# Patient Record
Sex: FEMALE | Race: WHITE | NOT HISPANIC OR LATINO | Employment: OTHER | URBAN - METROPOLITAN AREA
[De-identification: names, ages, dates, MRNs, and addresses within clinical notes are randomized per-mention and may not be internally consistent; named-entity substitution may affect disease eponyms.]

---

## 2017-09-25 ENCOUNTER — HOSPITAL ENCOUNTER (EMERGENCY)
Facility: HOSPITAL | Age: 37
Discharge: HOME | End: 2017-09-25
Attending: EMERGENCY MEDICINE | Admitting: EMERGENCY MEDICINE
Payer: COMMERCIAL

## 2017-09-25 VITALS
RESPIRATION RATE: 18 BRPM | TEMPERATURE: 98 F | DIASTOLIC BLOOD PRESSURE: 85 MMHG | WEIGHT: 185 LBS | BODY MASS INDEX: 31.58 KG/M2 | OXYGEN SATURATION: 97 % | HEART RATE: 76 BPM | SYSTOLIC BLOOD PRESSURE: 126 MMHG | HEIGHT: 64 IN

## 2017-09-25 DIAGNOSIS — L02.412 ABSCESS OF AXILLA, LEFT: ICD-10-CM

## 2017-09-25 DIAGNOSIS — L73.2 HYDRADENITIS: Primary | ICD-10-CM

## 2017-09-25 PROCEDURE — 87186 SC STD MICRODIL/AGAR DIL: CPT | Performed by: EMERGENCY MEDICINE

## 2017-09-25 PROCEDURE — 87070 CULTURE OTHR SPECIMN AEROBIC: CPT | Performed by: EMERGENCY MEDICINE

## 2017-09-25 PROCEDURE — 87147 CULTURE TYPE IMMUNOLOGIC: CPT | Performed by: EMERGENCY MEDICINE

## 2017-09-25 PROCEDURE — 87205 SMEAR GRAM STAIN: CPT | Performed by: EMERGENCY MEDICINE

## 2017-09-25 PROCEDURE — 99283 EMERGENCY DEPT VISIT LOW MDM: CPT

## 2017-09-25 RX ORDER — LIDOCAINE HYDROCHLORIDE AND EPINEPHRINE 10; 10 MG/ML; UG/ML
10 INJECTION, SOLUTION INFILTRATION; PERINEURAL ONCE
Status: COMPLETED | OUTPATIENT
Start: 2017-09-25 | End: 2017-09-25

## 2017-09-25 RX ORDER — DOXYCYCLINE HYCLATE 100 MG/1
100 CAPSULE ORAL EVERY 12 HOURS SCHEDULED
COMMUNITY
End: 2020-11-12 | Stop reason: ALTCHOICE

## 2017-09-25 RX ORDER — CLINDAMYCIN HYDROCHLORIDE 300 MG/1
300 CAPSULE ORAL 4 TIMES DAILY
Qty: 40 CAPSULE | Refills: 0 | Status: SHIPPED | OUTPATIENT
Start: 2017-09-25 | End: 2017-10-05

## 2017-09-25 RX ADMIN — LIDOCAINE HYDROCHLORIDE,EPINEPHRINE BITARTRATE 10 ML: 10; .01 INJECTION, SOLUTION INFILTRATION; PERINEURAL at 12:38

## 2017-09-27 LAB
BACTERIA WND AEROBE CULT: NORMAL
GRAM STN SPEC: NORMAL
GRAM STN SPEC: NORMAL

## 2020-11-12 ENCOUNTER — OFFICE VISIT (OUTPATIENT)
Dept: OBGYN CLINIC | Facility: CLINIC | Age: 40
End: 2020-11-12
Payer: COMMERCIAL

## 2020-11-12 VITALS
HEIGHT: 64 IN | SYSTOLIC BLOOD PRESSURE: 142 MMHG | WEIGHT: 193 LBS | DIASTOLIC BLOOD PRESSURE: 100 MMHG | TEMPERATURE: 97.8 F | BODY MASS INDEX: 32.95 KG/M2

## 2020-11-12 DIAGNOSIS — Z12.31 ENCOUNTER FOR SCREENING MAMMOGRAM FOR BREAST CANCER: ICD-10-CM

## 2020-11-12 DIAGNOSIS — Z12.4 SCREENING FOR MALIGNANT NEOPLASM OF THE CERVIX: ICD-10-CM

## 2020-11-12 DIAGNOSIS — Z11.51 SCREENING FOR HUMAN PAPILLOMAVIRUS: ICD-10-CM

## 2020-11-12 DIAGNOSIS — Z01.419 ENCNTR FOR GYN EXAM (GENERAL) (ROUTINE) W/O ABN FINDINGS: Primary | ICD-10-CM

## 2020-11-12 PROCEDURE — 99386 PREV VISIT NEW AGE 40-64: CPT | Performed by: OBSTETRICS & GYNECOLOGY

## 2020-11-12 PROCEDURE — G0145 SCR C/V CYTO,THINLAYER,RESCR: HCPCS | Performed by: OBSTETRICS & GYNECOLOGY

## 2020-11-12 PROCEDURE — 87624 HPV HI-RISK TYP POOLED RSLT: CPT | Performed by: OBSTETRICS & GYNECOLOGY

## 2020-11-12 RX ORDER — LEVONORGESTREL AND ETHINYL ESTRADIOL 0.1-0.02MG
1 KIT ORAL DAILY
COMMUNITY
End: 2020-11-12 | Stop reason: SDUPTHER

## 2020-11-12 RX ORDER — LEVONORGESTREL AND ETHINYL ESTRADIOL 0.1-0.02MG
1 KIT ORAL DAILY
Qty: 84 TABLET | Refills: 3 | Status: SHIPPED | OUTPATIENT
Start: 2020-11-12 | End: 2021-11-26

## 2020-11-13 LAB
HPV HR 12 DNA CVX QL NAA+PROBE: NEGATIVE
HPV16 DNA CVX QL NAA+PROBE: NEGATIVE
HPV18 DNA CVX QL NAA+PROBE: NEGATIVE

## 2020-11-18 LAB
LAB AP GYN PRIMARY INTERPRETATION: NORMAL
LAB AP LMP: NORMAL
Lab: NORMAL
PATH INTERP SPEC-IMP: NORMAL

## 2021-01-06 ENCOUNTER — HOSPITAL ENCOUNTER (OUTPATIENT)
Dept: MAMMOGRAPHY | Facility: CLINIC | Age: 41
Discharge: HOME | End: 2021-01-06
Payer: COMMERCIAL

## 2021-01-06 VITALS — WEIGHT: 190 LBS | HEIGHT: 64 IN | BODY MASS INDEX: 32.44 KG/M2

## 2021-01-06 DIAGNOSIS — Z12.31 ENCOUNTER FOR SCREENING MAMMOGRAM FOR BREAST CANCER: ICD-10-CM

## 2021-01-06 PROCEDURE — 77067 SCR MAMMO BI INCL CAD: CPT

## 2021-01-06 PROCEDURE — 77063 BREAST TOMOSYNTHESIS BI: CPT

## 2021-01-09 NOTE — RESULT ENCOUNTER NOTE
Incomplete: Needs Additional Imaging Evaluation     RECOMMENDATION:       - Diagnostic mammogram at the current time for the right breast        - Routine screening mammogram in 1 year for the left breast

## 2021-01-14 ENCOUNTER — HOSPITAL ENCOUNTER (OUTPATIENT)
Dept: MAMMOGRAPHY | Facility: CLINIC | Age: 41
Discharge: HOME | End: 2021-01-14
Payer: COMMERCIAL

## 2021-01-14 ENCOUNTER — TRANSCRIBE ORDERS (OUTPATIENT)
Dept: MAMMOGRAPHY | Facility: CLINIC | Age: 41
End: 2021-01-14

## 2021-01-14 VITALS — BODY MASS INDEX: 32.44 KG/M2 | WEIGHT: 190 LBS | HEIGHT: 64 IN

## 2021-01-14 DIAGNOSIS — R92.8 ABNORMAL MAMMOGRAM: Primary | ICD-10-CM

## 2021-01-14 DIAGNOSIS — R92.8 ABNORMAL MAMMOGRAM: ICD-10-CM

## 2021-01-14 PROCEDURE — 77065 DX MAMMO INCL CAD UNI: CPT

## 2021-01-15 DIAGNOSIS — R92.1 BREAST CALCIFICATIONS: Primary | ICD-10-CM

## 2021-02-03 ENCOUNTER — OFFICE VISIT (OUTPATIENT)
Dept: GASTROENTEROLOGY | Facility: CLINIC | Age: 41
End: 2021-02-03
Payer: COMMERCIAL

## 2021-02-03 VITALS
WEIGHT: 198.2 LBS | HEIGHT: 64 IN | DIASTOLIC BLOOD PRESSURE: 80 MMHG | SYSTOLIC BLOOD PRESSURE: 120 MMHG | HEART RATE: 94 BPM | BODY MASS INDEX: 33.84 KG/M2

## 2021-02-03 DIAGNOSIS — R11.0 NAUSEA: Primary | ICD-10-CM

## 2021-02-03 DIAGNOSIS — K21.9 GASTROESOPHAGEAL REFLUX DISEASE WITHOUT ESOPHAGITIS: ICD-10-CM

## 2021-02-03 DIAGNOSIS — R14.1 ABDOMINAL GAS PAIN: ICD-10-CM

## 2021-02-03 DIAGNOSIS — K90.89 BILE SALT-INDUCED DIARRHEA: ICD-10-CM

## 2021-02-03 PROCEDURE — 99203 OFFICE O/P NEW LOW 30 MIN: CPT | Performed by: PHYSICIAN ASSISTANT

## 2021-02-03 RX ORDER — ESOMEPRAZOLE MAGNESIUM 40 MG/1
40 CAPSULE, DELAYED RELEASE ORAL
Qty: 60 CAPSULE | Refills: 3 | Status: SHIPPED | OUTPATIENT
Start: 2021-02-03 | End: 2021-04-29

## 2021-02-03 RX ORDER — LEVOTHYROXINE SODIUM 175 MCG
TABLET ORAL
COMMUNITY
Start: 2020-11-25

## 2021-02-03 RX ORDER — MONTELUKAST SODIUM 4 MG/1
1 TABLET, CHEWABLE ORAL 2 TIMES DAILY
Qty: 60 TABLET | Refills: 3 | Status: SHIPPED | OUTPATIENT
Start: 2021-02-03 | End: 2021-04-28

## 2021-02-03 NOTE — PROGRESS NOTES
Answers for HPI/ROS submitted by the patient on 1/30/2021   Abdominal pain  Chronicity: chronic  Onset: more than 1 month ago  Onset quality: gradual  Frequency: every several days  Episode duration: 3 hours  Progression since onset: unchanged  Pain location: generalized abdominal region  Pain - numeric: 6/10  Pain quality: aching, burning, dull  Radiates to: epigastric region  anorexia: No  arthralgias: No  belching: Yes  constipation: No  diarrhea: Yes  dysuria: No  fever: No  flatus: No  frequency: No  headaches: No  hematochezia: No  hematuria: No  melena: No  myalgias: No  nausea: Yes  weight loss: No  vomiting: No  Aggravated by: nothing  Relieved by: belching, vomiting  Diagnostic workup: upper endoscopy  [facility] - Outpatient Consultation  Yesenia Lemons 36 y o  female MRN: [id number]  Encounter: [id number]          ASSESSMENT AND PLAN:      1  Nausea  2  Gastroesophageal reflux disease without esophagitis  3  Abdominal gas pain   Will obtain previous EGD results from [address] [place] [address] in \[facility] [place] [facility]\""  Will start esomeprazole 40 mg twice a day for at least 8-12 weeks  Will plan gastric emptying study solid phase  4  Bile salt-induced diarrhea   Will start colestipol 1 g a day  Will start over-the-counter align probiotic as well as sugar free fiber supplement     ______________________________________________________________________    HPI:      44-year-old female who presents to the office today with ongoing stomach issues for many years  Patient does suffer from chronic heartburn and she does medicate with over-the-counter antacids on a daily basis  Patient reports over the last several months she has been really struggling with not feeling well after eating  She reports that every time she eats whether be something good or bad for her she will develop a heavy sick nauseous feeling  She denies any significant dietary triggers    She reports she was given Carafate as well as Reglan for a 2 week trial that did not help her at all  She was also given pantoprazole several months ago as a trial and she did this for 5-6 weeks with no benefit  She reports that she did have her gallbladder taken out secondary to biliary dyskinesia many years ago  She did recently have an upper endoscopy performed by Dr Sweta Bach  We will need to obtain these results  Patient denies any alarm symptoms  She denies any melena or rectal bleeding  She reports diarrhea likely secondary to a bile salt diarrhea  She reports immediately after she eats she will be in the bathroom within a few minutes time  REVIEW OF SYSTEMS:    CONSTITUTIONAL: Denies any fever, chills, rigors, and weight loss  HEENT: No earache or tinnitus  Denies hearing loss or visual disturbances  CARDIOVASCULAR: No chest pain or palpitations  RESPIRATORY: Denies any cough, hemoptysis, shortness of breath or dyspnea on exertion  GASTROINTESTINAL: As noted in the History of Present Illness  GENITOURINARY: No problems with urination  Denies any hematuria or dysuria  NEUROLOGIC: No dizziness or vertigo, denies headaches  MUSCULOSKELETAL: Denies any muscle or joint pain  SKIN: Denies skin rashes or itching  ENDOCRINE: Denies excessive thirst  Denies intolerance to heat or cold  PSYCHOSOCIAL: Denies depression or anxiety  Denies any recent memory loss         Historical Information   Past Medical History:   Diagnosis Date    Depression     Disease of thyroid gland     WPW (Holley-Parkinson-White syndrome)      Past Surgical History:   Procedure Laterality Date    ABLATION RADIO FREQUENCY ATRIAL (MAZE/CRYOABLATION)      ANKLE SURGERY      CERVICAL CONE BIOPSY      CHOLECYSTECTOMY      KNEE SURGERY      acl    THYROIDECTOMY       Social History   Social History     Substance and Sexual Activity   Alcohol Use No     Social History     Substance and Sexual Activity   Drug Use No     Social History     Tobacco Use Smoking Status Never Smoker   Smokeless Tobacco Never Used     Family History   Problem Relation Age of Onset    Stomach cancer Mother     Sarcoidosis Father     Diabetes Sister     No Known Problems Maternal Grandmother     Breast cancer Paternal Grandmother 79    No Known Problems Paternal Aunt        Meds/Allergies       Current Outpatient Medications:     levonorgestrel-ethinyl estradiol (Sronyx) 0 1-20 MG-MCG per tablet    Synthroid 175 MCG tablet    colestipol (COLESTID) 1 g tablet    esomeprazole (NexIUM) 40 MG capsule    Allergies   Allergen Reactions    Cephalosporins Hives    Penicillins Hives    Sulfa Antibiotics Hives           Objective     Blood pressure 120/80, pulse 94, height 5' 4" (1 626 m), weight 89 9 kg (198 lb 3 2 oz), not currently breastfeeding  Body mass index is 34 02 kg/m²  PHYSICAL EXAM:      General Appearance:   Alert, cooperative, no distress   HEENT:   Normocephalic, atraumatic, anicteric      Neck:  Supple, symmetrical, trachea midline   Lungs:   Clear to auscultation bilaterally; no rales, rhonchi or wheezing; respirations unlabored    Heart[de-identified]   Regular rate and rhythm; no murmur, rub, or gallop  Abdomen:   Soft, non-tender, non-distended; normal bowel sounds; no masses, no organomegaly    Genitalia:   Deferred    Rectal:   Deferred    Extremities:  No cyanosis, clubbing or edema    Pulses:  2+ and symmetric    Skin:  No jaundice, rashes, or lesions    Lymph nodes:  No palpable cervical lymphadenopathy        Lab Results:   No visits with results within 1 Day(s) from this visit     Latest known visit with results is:   Office Visit on 11/12/2020   Component Date Value    Case Report 11/12/2020                      Value:Gynecologic Cytology Report                       Case: [id number]                                  Authorizing Provider:  Joe Jones MD       Collected:           11/12/2020 1146              Ordering Location:     [facility]    Received:            11/12/2020 [address]                                                             First Screen:          Rojas Members                                                                  Rescreen:              Gilda Carrasco, CT                                                       Specimen:    LIQUID-BASED PAP, SCREENING, Cervix                                                        Primary Interpretation 11/12/2020 Negative for intraepithelial lesion or malignancy     Interpretation 11/12/2020 Fungal organisms morphologically consistent with Candida spp     Specimen Adequacy 11/12/2020 Satisfactory for evaluation  Endocervical/transformation zone component present   Additional Information 11/12/2020                      Value: This result contains rich text formatting which cannot be displayed here   LMP 11/12/2020 11/1/2020     HPV Other HR 11/12/2020 Negative     HPV16 11/12/2020 Negative     HPV18 11/12/2020 Negative          Radiology Results:   Mammo Diagnostic Right W Cad    Result Date: 1/14/2021  Narrative: DIAGNOSIS: Abnormal mammogram TECHNIQUE: Digital screening mammography was performed  Computer Aided Detection (CAD) analyzed all applicable images  COMPARISONS: Prior breast imaging dated: 01/06/2021 RELEVANT HISTORY: Family Breast Cancer History: History of breast cancer in Paternal Grandmother  Family Medical History: Family medical history includes breast cancer in paternal grandmother  Personal History: Hormone history includes birth control  No known relevant surgical history  No known relevant medical history  RISK ASSESSMENT: 5 Year Tyrer-Cuzick: 1 18 % 10 Year Tyrer-Cuzick: 2 99 % Lifetime Tyrer-Cuzick: 22 57 % TISSUE DENSITY: The breasts are heterogeneously dense, which may obscure small masses  INDICATION: Maren Bojorquez is a 36 y o  female presenting for abnormal mammogram, calcifications   FINDINGS: RIGHT 1) CALCIFICATIONS: There are grouped calcifications in the upper outer quadrant of the right breast in the middle depth, 7 cm from the nipple  These appear to layer on the ML view  They are probably benign, likely milk of calcium  Six-month follow-up mammogram is recommended  Impression:  Right breast calcifications are probably benign  Six-month follow-up mammograms recommended  This patient has been identified as being at elevated risk for development of breast cancer based on the Tyrer-Cuzick model  She may benefit from supplemental screening  ASSESSMENT/BI-RADS CATEGORY: Right: 3 - Probably Benign Overall: 3 - Probably Benign RECOMMENDATION:      - Diagnostic mammogram in 6 months for the right breast  Workstation ID: [id number]    Mammo Screening Bilateral W 3d & Cad    Result Date: 1/6/2021  Narrative: DIAGNOSIS: Encounter for screening mammogram for breast cancer TECHNIQUE: Digital screening mammography was performed  Computer Aided Detection (CAD) analyzed all applicable images  COMPARISONS: None available  RELEVANT HISTORY: Family Breast Cancer History: History of breast cancer in Paternal Grandmother  Family Medical History: Family medical history includes breast cancer in paternal grandmother  Personal History: Hormone history includes birth control  No known relevant surgical history  No known relevant medical history  The patient is scheduled in a reminder system for screening mammography  8-10% of cancers will be missed on mammography  Management of a palpable abnormality must be based on clinical grounds  Patients will be notified of their results via letter from our facility  Accredited by [employer] and FDA  RISK ASSESSMENT: 5 Year Tyrer-Cuzick: 1 18 % 10 Year Tyrer-Cuzick: 2 99 % Lifetime Tyrer-Cuzick: 22 57 % TISSUE DENSITY: The breasts are heterogeneously dense, which may obscure small masses   INDICATION: Pb Easley is a 36 y o  female presenting for screening mammography  FINDINGS: RIGHT 1) CALCIFICATIONS: There are calcifications seen in the upper outer quadrant of the right breast in the middle depth, 7 cm from the nipple  LEFT There are no suspicious masses, grouped microcalcifications or areas of architectural distortion  The skin and nipple areolar complex are unremarkable  Impression: Additional imaging required  A breast health care nurse from our facility will be contacting the patient regarding the need for additional imaging This patient has been identified as being at elevated risk for development of breast cancer based on the Tyrer-Cuzick model  She may benefit from supplemental screening   ASSESSMENT/BI-RADS CATEGORY: Left: 1 - Negative Right: 0 - Incomplete: Needs Additional Imaging Evaluation Overall: 0 - Incomplete: Needs Additional Imaging Evaluation RECOMMENDATION:      - Diagnostic mammogram at the current time for the right breast       - Routine screening mammogram in 1 year for the left breast  Workstation ID: [id number]

## 2021-02-03 NOTE — LETTER
February 3, 2021     Comfort TarynDO  [address]  [facility] [address]    Patient: Christine Mendoza   YOB: 1980   Date of Visit: 2/3/2021       Dear Dr Gage August: Thank you for referring Christine Mendoza to me for evaluation  Below are my notes for this consultation  If you have questions, please do not hesitate to call me  I look forward to following your patient along with you  Sincerely,        Joanne Pace PA-C        CC: No Recipients  Joanne Pace PA-C  2/3/2021  1:52 PM  Sign when Signing Visit  Answers for HPI/ROS submitted by the patient on 1/30/2021   Abdominal pain  Chronicity: chronic  Onset: more than 1 month ago  Onset quality: gradual  Frequency: every several days  Episode duration: 3 hours  Progression since onset: unchanged  Pain location: generalized abdominal region  Pain - numeric: 6/10  Pain quality: aching, burning, dull  Radiates to: epigastric region  anorexia: No  arthralgias: No  belching: Yes  constipation: No  diarrhea: Yes  dysuria: No  fever: No  flatus: No  frequency: No  headaches: No  hematochezia: No  hematuria: No  melena: No  myalgias: No  nausea: Yes  weight loss: No  vomiting: No  Aggravated by: nothing  Relieved by: belching, vomiting  Diagnostic workup: upper endoscopy  [facility] - Outpatient Consultation  Yesenia Lemons 36 y o  female MRN: [id number]  Encounter: [id number]          ASSESSMENT AND PLAN:      1  Nausea  2  Gastroesophageal reflux disease without esophagitis  3  Abdominal gas pain   Will obtain previous EGD results from [address] [place] [address] in [facility]  Will start esomeprazole 40 mg twice a day for at least 8-12 weeks  Will plan gastric emptying study solid phase  4  Bile salt-induced diarrhea   Will start colestipol 1 g a day     Will start over-the-counter align probiotic as well as sugar free fiber supplement     ______________________________________________________________________    HPI: 25-year-old female who presents to the office today with ongoing stomach issues for many years  Patient does suffer from chronic heartburn and she does medicate with over-the-counter antacids on a daily basis  Patient reports over the last several months she has been really struggling with not feeling well after eating  She reports that every time she eats whether be something good or bad for her she will develop a heavy sick nauseous feeling  She denies any significant dietary triggers  She reports she was given Carafate as well as Reglan for a 2 week trial that did not help her at all  She was also given pantoprazole several months ago as a trial and she did this for 5-6 weeks with no benefit  She reports that she did have her gallbladder taken out secondary to biliary dyskinesia many years ago  She did recently have an upper endoscopy performed by Dr Sweta Bach  We will need to obtain these results  Patient denies any alarm symptoms  She denies any melena or rectal bleeding  She reports diarrhea likely secondary to a bile salt diarrhea  She reports immediately after she eats she will be in the bathroom within a few minutes time  REVIEW OF SYSTEMS:    CONSTITUTIONAL: Denies any fever, chills, rigors, and weight loss  HEENT: No earache or tinnitus  Denies hearing loss or visual disturbances  CARDIOVASCULAR: No chest pain or palpitations  RESPIRATORY: Denies any cough, hemoptysis, shortness of breath or dyspnea on exertion  GASTROINTESTINAL: As noted in the History of Present Illness  GENITOURINARY: No problems with urination  Denies any hematuria or dysuria  NEUROLOGIC: No dizziness or vertigo, denies headaches  MUSCULOSKELETAL: Denies any muscle or joint pain  SKIN: Denies skin rashes or itching  ENDOCRINE: Denies excessive thirst  Denies intolerance to heat or cold  PSYCHOSOCIAL: Denies depression or anxiety  Denies any recent memory loss         Historical Information Past Medical History:   Diagnosis Date    Depression     Disease of thyroid gland     WPW (Holley-Parkinson-White syndrome)      Past Surgical History:   Procedure Laterality Date    ABLATION RADIO FREQUENCY ATRIAL (MAZE/CRYOABLATION)      ANKLE SURGERY      CERVICAL CONE BIOPSY      CHOLECYSTECTOMY      KNEE SURGERY      acl    THYROIDECTOMY       Social History   Social History     Substance and Sexual Activity   Alcohol Use No     Social History     Substance and Sexual Activity   Drug Use No     Social History     Tobacco Use   Smoking Status Never Smoker   Smokeless Tobacco Never Used     Family History   Problem Relation Age of Onset    Stomach cancer Mother     Sarcoidosis Father     Diabetes Sister     No Known Problems Maternal Grandmother     Breast cancer Paternal Grandmother 79    No Known Problems Paternal Aunt        Meds/Allergies       Current Outpatient Medications:     levonorgestrel-ethinyl estradiol (Sronyx) 0 1-20 MG-MCG per tablet    Synthroid 175 MCG tablet    colestipol (COLESTID) 1 g tablet    esomeprazole (NexIUM) 40 MG capsule    Allergies   Allergen Reactions    Cephalosporins Hives    Penicillins Hives    Sulfa Antibiotics Hives           Objective     Blood pressure 120/80, pulse 94, height 5' 4" (1 626 m), weight 89 9 kg (198 lb 3 2 oz), not currently breastfeeding  Body mass index is 34 02 kg/m²  PHYSICAL EXAM:      General Appearance:   Alert, cooperative, no distress   HEENT:   Normocephalic, atraumatic, anicteric      Neck:  Supple, symmetrical, trachea midline   Lungs:   Clear to auscultation bilaterally; no rales, rhonchi or wheezing; respirations unlabored    Heart[de-identified]   Regular rate and rhythm; no murmur, rub, or gallop     Abdomen:   Soft, non-tender, non-distended; normal bowel sounds; no masses, no organomegaly    Genitalia:   Deferred    Rectal:   Deferred    Extremities:  No cyanosis, clubbing or edema    Pulses:  2+ and symmetric    Skin:  No jaundice, rashes, or lesions    Lymph nodes:  No palpable cervical lymphadenopathy        Lab Results:   No visits with results within 1 Day(s) from this visit  Latest known visit with results is:   Office Visit on 11/12/2020   Component Date Value    Case Report 11/12/2020                      Value:Gynecologic Cytology Report                       Case: [id number]                                  Authorizing Provider:  Sofia Hair MD       Collected:           11/12/2020 1146              Ordering Location:     [facility]    Received:            11/12/2020 [address]                                                             First Screen:          Anup Oar                                                                  Rescreen:              DAYNA Nicholson                                                       Specimen:    LIQUID-BASED PAP, SCREENING, Cervix                                                        Primary Interpretation 11/12/2020 Negative for intraepithelial lesion or malignancy     Interpretation 11/12/2020 Fungal organisms morphologically consistent with Candida spp     Specimen Adequacy 11/12/2020 Satisfactory for evaluation  Endocervical/transformation zone component present   Additional Information 11/12/2020                      Value: This result contains rich text formatting which cannot be displayed here   LMP 11/12/2020 11/1/2020     HPV Other HR 11/12/2020 Negative     HPV16 11/12/2020 Negative     HPV18 11/12/2020 Negative          Radiology Results:   Mammo Diagnostic Right W Cad    Result Date: 1/14/2021  Narrative: DIAGNOSIS: Abnormal mammogram TECHNIQUE: Digital screening mammography was performed  Computer Aided Detection (CAD) analyzed all applicable images   COMPARISONS: Prior breast imaging dated: 01/06/2021 RELEVANT HISTORY: Family Breast Cancer History: History of breast cancer in Paternal Grandmother  Family Medical History: Family medical history includes breast cancer in paternal grandmother  Personal History: Hormone history includes birth control  No known relevant surgical history  No known relevant medical history  RISK ASSESSMENT: 5 Year Tyrer-Cuzick: 1 18 % 10 Year Tyrer-Cuzick: 2 99 % Lifetime Tyrer-Cuzick: 22 57 % TISSUE DENSITY: The breasts are heterogeneously dense, which may obscure small masses  INDICATION: Geovanna Ricketts is a 36 y o  female presenting for abnormal mammogram, calcifications  FINDINGS: RIGHT 1) CALCIFICATIONS: There are grouped calcifications in the upper outer quadrant of the right breast in the middle depth, 7 cm from the nipple  These appear to layer on the ML view  They are probably benign, likely milk of calcium  Six-month follow-up mammogram is recommended  Impression:  Right breast calcifications are probably benign  Six-month follow-up mammograms recommended  This patient has been identified as being at elevated risk for development of breast cancer based on the Tyrer-Cuzick model  She may benefit from supplemental screening  ASSESSMENT/BI-RADS CATEGORY: Right: 3 - Probably Benign Overall: 3 - Probably Benign RECOMMENDATION:      - Diagnostic mammogram in 6 months for the right breast  Workstation ID: [id number]    Mammo Screening Bilateral W 3d & Cad    Result Date: 1/6/2021  Narrative: DIAGNOSIS: Encounter for screening mammogram for breast cancer TECHNIQUE: Digital screening mammography was performed  Computer Aided Detection (CAD) analyzed all applicable images  COMPARISONS: None available  RELEVANT HISTORY: Family Breast Cancer History: History of breast cancer in Paternal Grandmother  Family Medical History: Family medical history includes breast cancer in paternal grandmother  Personal History: Hormone history includes birth control  No known relevant surgical history  No known relevant medical history   The patient is scheduled in a reminder system for screening mammography  8-10% of cancers will be missed on mammography  Management of a palpable abnormality must be based on clinical grounds  Patients will be notified of their results via letter from our facility  Accredited by [employer] and FDA  RISK ASSESSMENT: 5 Year Tyrer-Cuzick: 1 18 % 10 Year Tyrer-Cuzick: 2 99 % Lifetime Tyrer-Cuzick: 22 57 % TISSUE DENSITY: The breasts are heterogeneously dense, which may obscure small masses  INDICATION: Bernadette Blair is a 36 y o  female presenting for screening mammography  FINDINGS: RIGHT 1) CALCIFICATIONS: There are calcifications seen in the upper outer quadrant of the right breast in the middle depth, 7 cm from the nipple  LEFT There are no suspicious masses, grouped microcalcifications or areas of architectural distortion  The skin and nipple areolar complex are unremarkable  Impression: Additional imaging required  A breast health care nurse from our facility will be contacting the patient regarding the need for additional imaging This patient has been identified as being at elevated risk for development of breast cancer based on the Tyrer-Cuzick model  She may benefit from supplemental screening   ASSESSMENT/BI-RADS CATEGORY: Left: 1 - Negative Right: 0 - Incomplete: Needs Additional Imaging Evaluation Overall: 0 - Incomplete: Needs Additional Imaging Evaluation RECOMMENDATION:      - Diagnostic mammogram at the current time for the right breast       - Routine screening mammogram in 1 year for the left breast  Workstation ID: [id number]

## 2021-02-03 NOTE — PATIENT INSTRUCTIONS
Gastroesophageal Reflux Disease   WHAT YOU NEED TO KNOW:   Gastroesophageal reflux disease (GERD) is reflux that occurs more than twice a week for a few weeks  Reflux means acid and food in the stomach back up into the esophagus  It usually causes heartburn and other symptoms  GERD can cause other health problems over time if it is not treated  DISCHARGE INSTRUCTIONS:   Call your local emergency number (911 in the [address] [facility] [facility]) if:   · You have severe chest pain and sudden trouble breathing  Return to the emergency department if:   · You have trouble breathing after you vomit  · You have trouble swallowing, or pain with swallowing  · Your bowel movements are black, bloody, or tarry-looking  · Your vomit looks like coffee grounds or has blood in it  Call your doctor or gastroenterologist if:   · You feel full and cannot burp or vomit  · You vomit large amounts, or you vomit often  · You are losing weight without trying  · Your symptoms get worse or do not improve with treatment  · You have questions or concerns about your condition or care  Medicines:   · Medicines  are used to decrease stomach acid  Medicine may also be used to help your lower esophageal sphincter and stomach contract (tighten) more  · Take your medicine as directed  Contact your healthcare provider if you think your medicine is not helping or if you have side effects  Tell him of her if you are allergic to any medicine  Keep a list of the medicines, vitamins, and herbs you take  Include the amounts, and when and why you take them  Bring the list or the pill bottles to follow-up visits  Carry your medicine list with you in case of an emergency  Manage GERD:   · Do not have foods or drinks that may increase heartburn  These include chocolate, peppermint, fried or fatty foods, drinks that contain caffeine, or carbonated drinks (soda)  Other foods include spicy foods, onions, tomatoes, and tomato-based foods   Do not have foods or drinks that can irritate your esophagus, such as citrus fruits, juices, and alcohol  · Do not eat large meals  When you eat a lot of food at one time, your stomach needs more acid to digest it  Eat 6 small meals each day instead of 3 large ones, and eat slowly  Do not eat meals 2 to 3 hours before bedtime  · Elevate the head of your bed  Place 6-inch blocks under the head of your bed frame  You may also use more than one pillow under your head and shoulders while you sleep  · Maintain a healthy weight  If you are overweight, weight loss may help relieve symptoms of GERD  · Do not smoke  Smoking weakens the lower esophageal sphincter and increases the risk of GERD  Ask your healthcare provider for information if you currently smoke and need help to quit  E-cigarettes or smokeless tobacco still contain nicotine  Talk to your healthcare provider before you use these products  · Do not wear clothing that is tight around your waist   Tight clothing can put pressure on your stomach and cause or worsen GERD symptoms  Follow up with your doctor or gastroenterologist as directed:  Write down your questions so you remember to ask them during your visits  © Copyright [address] [place] [address] Drive Information is for End User's use only and may not be sold, redistributed or otherwise used for commercial purposes  All illustrations and images included in CareNotes® are the copyrighted property of A D A M , Inc  or [facility] Melissa Ryan   The above information is an  only  It is not intended as medical advice for individual conditions or treatments  Talk to your doctor, nurse or pharmacist before following any medical regimen to see if it is safe and effective for you

## 2021-02-09 ENCOUNTER — TELEPHONE (OUTPATIENT)
Dept: GASTROENTEROLOGY | Facility: CLINIC | Age: 41
End: 2021-02-09

## 2021-02-09 NOTE — TELEPHONE ENCOUNTER
Gini Bernheim from Dr Marichuy Galdamez from [place], [place] Tel # [phone] Fax # [phone]  She will tried to fax us the EGD report  Scanned pt release form to pt's chart        I contacted [facility] records -  No record on file

## 2021-03-12 ENCOUNTER — HOSPITAL ENCOUNTER (OUTPATIENT)
Dept: NUCLEAR MEDICINE | Facility: HOSPITAL | Age: 41
Discharge: HOME | End: 2021-03-12
Payer: COMMERCIAL

## 2021-03-12 DIAGNOSIS — R11.0 NAUSEA: ICD-10-CM

## 2021-03-12 PROCEDURE — A9541 TC99M SULFUR COLLOID: HCPCS

## 2021-03-12 PROCEDURE — G1004 CDSM NDSC: HCPCS

## 2021-03-12 PROCEDURE — 78264 GASTRIC EMPTYING IMG STUDY: CPT

## 2021-04-09 DIAGNOSIS — Z23 ENCOUNTER FOR IMMUNIZATION: ICD-10-CM

## 2021-04-28 DIAGNOSIS — K21.9 GASTROESOPHAGEAL REFLUX DISEASE WITHOUT ESOPHAGITIS: ICD-10-CM

## 2021-04-28 DIAGNOSIS — R11.0 NAUSEA: ICD-10-CM

## 2021-04-28 RX ORDER — MONTELUKAST SODIUM 4 MG/1
1 TABLET, CHEWABLE ORAL 2 TIMES DAILY
Qty: 180 TABLET | Refills: 1 | Status: SHIPPED | OUTPATIENT
Start: 2021-04-28 | End: 2022-01-25

## 2021-04-29 DIAGNOSIS — K21.9 GASTROESOPHAGEAL REFLUX DISEASE WITHOUT ESOPHAGITIS: ICD-10-CM

## 2021-04-29 DIAGNOSIS — R11.0 NAUSEA: ICD-10-CM

## 2021-04-29 RX ORDER — ESOMEPRAZOLE MAGNESIUM 40 MG/1
40 CAPSULE, DELAYED RELEASE ORAL
Qty: 180 CAPSULE | Refills: 1 | Status: SHIPPED | OUTPATIENT
Start: 2021-04-29 | End: 2022-01-31 | Stop reason: SDUPTHER

## 2021-05-03 ENCOUNTER — TELEPHONE (OUTPATIENT)
Dept: HEMATOLOGY ONCOLOGY | Facility: CLINIC | Age: 41
End: 2021-05-03

## 2021-05-03 NOTE — TELEPHONE ENCOUNTER
New Patient Breast Form   Patient Details:     Roland Reeves     1980     [id number]     Background Information:   [address] starts by opening a telephone encounter and gathering the following information   Who is calling to schedule and relationship? self   Referring Provider José Miguel Hubbard   To which speciality is the referral?  Surgical Oncology   Reason for Visit? new   Tumor Type?  calcifications   Is there a confimed diagnosis from biopsy/tissue reviewed by Pathology? No   Date of Tissue Diagnosis (If done outside of [facility] please obtain report and slides)    Is patient aware of diagnosis, and who made them aware? Yes / Dr Annabella Aponte   If no tissue diagnosis, please stop and discuss with Navigator prior to scheduling   When was the diagnosis made? 1/2021   Were outside slides requested     If biopsy done externally, obtain reports and slides for internal review   Have you had any imaging or labs done? Yes   If YES, when and  where was the blood work done? [facility]   If outside of [facility] obtain records   Was the patient told to bring a disk? No   Are records in EPIC? Yes   Is there a personal history of cancer? No   If YES please list type and YR diagnosed    If patient has a prior history of breast cancer were old records obtained? Have you ever had genetic testing done for breast cancer? If so, can you please bring a copy to appointment for timely treatment planning No   Is there a family history of cancer? Yes   If YES please list type Breast, gastric   Scheduling Information:   [facility]   Are there any days the patient cannot be seen? How was New Patient Packet Sent? Email   Miscellaneous: PT HAS NO PRIORS   After completing the above information, please route to finance, nurse navigation and clinical trials for review

## 2021-05-04 PROBLEM — R92.1 BREAST CALCIFICATIONS ON MAMMOGRAM: Status: ACTIVE | Noted: 2021-05-04

## 2021-05-05 ENCOUNTER — CONSULT (OUTPATIENT)
Dept: SURGICAL ONCOLOGY | Facility: CLINIC | Age: 41
End: 2021-05-05
Payer: COMMERCIAL

## 2021-05-05 VITALS
RESPIRATION RATE: 14 BRPM | TEMPERATURE: 98.1 F | BODY MASS INDEX: 32.5 KG/M2 | HEART RATE: 88 BPM | WEIGHT: 190.4 LBS | HEIGHT: 64 IN | DIASTOLIC BLOOD PRESSURE: 78 MMHG | SYSTOLIC BLOOD PRESSURE: 110 MMHG

## 2021-05-05 DIAGNOSIS — R92.1 BREAST CALCIFICATIONS ON MAMMOGRAM: Primary | ICD-10-CM

## 2021-05-05 PROCEDURE — 99205 OFFICE O/P NEW HI 60 MIN: CPT | Performed by: SURGERY

## 2021-05-05 NOTE — PROGRESS NOTES
Surgical Oncology Consult Note       [facility] [address] [facility] [address] [facility]  [address] [facility] [address]  [facility] [address]  [phone]    La Nena Mendoza  1980  [id number]  [facility]  [facility] [place]  [address] [facility] [address]  [address]  [phone]      Chief Complaint   Patient presents with    Consult     Calcification rt breast         Assessment/Plan    1  Breast calcifications on mammogram  Assessment & Plan:  Mammogram films were reviewed by myself and noted to be most likely benign calcification I agree with the radiologist it is better to repeat her diagnostic mammogram in 6 months from her last diagnostic mammogram            Oncology History    No history exists  La Nena Mendoza is a 36y o  year old female who is here after 1st screening mammogram this shows red as her BI-RADS 0 right breast followed by a a diagnostic mammogram which read as BI-RADS 3  she denies of any breast complaints please see for breast exam under physical examinations  Review of Systems   Constitutional: Negative for chills and fever  HENT: Negative for ear pain and sore throat  Eyes: Negative for pain and visual disturbance  Respiratory: Negative for cough and shortness of breath  Cardiovascular: Negative for chest pain and palpitations  Gastrointestinal: Negative for abdominal pain and vomiting  Genitourinary: Negative for dysuria and hematuria  Musculoskeletal: Negative for arthralgias and back pain  Skin: Negative for color change and rash  Neurological: Negative for seizures and syncope  All other systems reviewed and are negative         Past Medical History:      Patient Active Problem List   Diagnosis    Breast calcifications on mammogram        Past Medical History:   Diagnosis Date    Depression     Disease of thyroid gland     WPW (Holley-Parkinson-White syndrome)         Past Surgical History:   Procedure Laterality Date    ABLATION RADIO FREQUENCY ATRIAL (MAZE/CRYOABLATION)      ANKLE SURGERY      CERVICAL CONE BIOPSY      CHOLECYSTECTOMY      KNEE SURGERY      acl    THYROIDECTOMY          Family History   Problem Relation Age of Onset    Stomach cancer Mother    [facility] Sarcoidosis Father     Diabetes Sister     No Known Problems Maternal Grandmother     Breast cancer Paternal Grandmother 79    No Known Problems Paternal Aunt         Social History     Socioeconomic History    Marital status: /Civil Union     Spouse name: Not on file    Number of children: Not on file    Years of education: Not on file    Highest education level: Not on file   Occupational History    Not on file   Social Needs    Financial resource strain: Not on file    Food insecurity     Worry: Not on file     Inability: Not on file    Transportation needs     Medical: Not on file     Non-medical: Not on file   Tobacco Use    Smoking status: Never Smoker    Smokeless tobacco: Never Used   Substance and Sexual Activity    Alcohol use: Yes     Frequency: Monthly or less     Drinks per session: 1 or 2    Drug use: No    Sexual activity: Yes     Partners: Male     Birth control/protection: OCP     Comment: Sronyx   Lifestyle    Physical activity     Days per week: Not on file     Minutes per session: Not on file    Stress: Not on file   Relationships    Social connections     Talks on phone: Not on file     Gets together: Not on file     Attends Mandaen service: Not on file     Active member of club or organization: Not on file     Attends meetings of clubs or organizations: Not on file     Relationship status: Not on file    Intimate partner violence     Fear of current or ex partner: Not on file     Emotionally abused: Not on file     Physically abused: Not on file     Forced sexual activity: Not on file   Other Topics Concern  Not on file   Social History Narrative    Not on file        Current Outpatient Medications:     colestipol (COLESTID) 1 g tablet, TAKE 1 TABLET (1 G TOTAL) BY MOUTH 2 (TWO) TIMES A DAY, Disp: 180 tablet, Rfl: 1    [employer] FIBER GUMMIES PO, Take by mouth, Disp: , Rfl:     esomeprazole (NexIUM) 40 MG capsule, TAKE 1 CAPSULE (40 MG TOTAL) BY MOUTH 2 (TWO) TIMES A DAY BEFORE MEALS, Disp: 180 capsule, Rfl: 1    levonorgestrel-ethinyl estradiol (Sronyx) 0 1-20 MG-MCG per tablet, Take 1 tablet by mouth daily, Disp: 84 tablet, Rfl: 3    Probiotic Product (PROBIOTIC-10 PO), Take by mouth, Disp: , Rfl:     Synthroid 175 MCG tablet, TAKE 1 TABLET EVERY MORNING   Nahomy Berman Nahomy Berman PER MD MUST GET BLOODWORK DONE, Disp: , Rfl:      Allergies   Allergen Reactions    Cephalosporins Hives    Penicillins Hives    Sulfa Antibiotics Hives       Physical Exam:     Vitals:    05/05/21 1248   BP: 110/78   Pulse: 88   Resp: 14   Temp: 98 1 °F (36 7 °C)     Physical Exam  Constitutional:       Appearance: Normal appearance  HENT:      Head: Normocephalic and atraumatic  Nose: Nose normal       Mouth/Throat:      Mouth: Mucous membranes are moist    Eyes:      Pupils: Pupils are equal, round, and reactive to light  Neck:      Musculoskeletal: Normal range of motion and neck supple  Cardiovascular:      Rate and Rhythm: Normal rate  Pulses: Normal pulses  Heart sounds: Normal heart sounds  Pulmonary:      Effort: Pulmonary effort is normal       Breath sounds: Normal breath sounds  Chest:      Comments: The bilateral Breast(s) were examined  There was not any sign of an inverted nipple, mass, nipple discharge, skin changes or tenderness  The bilateral  breast(s) were examined in the sitting and supine position  There are not any worrisome skin changes, tenderness, nipple changes, swelling ,bleeding or evidence of mass/s in all four quadrants   Kiara survey demonstrated that there is not any evidence of any clinically suspicious axillary, pectoral or supraclavicular lymph nodes  Abdominal:      General: Bowel sounds are normal       Palpations: Abdomen is soft  Musculoskeletal: Normal range of motion  Skin:     General: Skin is warm  Neurological:      General: No focal deficit present  Mental Status: She is alert and oriented to person, place, and time  Psychiatric:         Mood and Affect: Mood normal          Behavior: Behavior normal          Thought Content: Thought content normal          Judgment: Judgment normal          Results:   Screening Mammogram; (1/2021)FINDINGS:   RIGHT  1) CALCIFICATIONS: There are calcifications seen in the upper outer quadrant of the right breast in the middle depth, 7 cm from the nipple       LEFT  There are no suspicious masses, grouped microcalcifications or areas of architectural distortion  The skin and nipple areolar complex are unremarkable        Diagnostic Mammogram;FINDINGS:   RIGHT  1) CALCIFICATIONS: There are grouped calcifications in the upper outer quadrant of the right breast in the middle depth, 7 cm from the nipple  These appear to layer on the ML view  They are probably benign, likely milk of calcium  Six-month follow-up mammogram is recommended      IMPRESSION:   Right breast calcifications are probably benign  Six-month follow-up mammograms recommended      This patient has been identified as being at elevated risk for development of breast cancer based on the Tyrer-Cuzick model  She may benefit from supplemental screening      ASSESSMENT/BI-RADS CATEGORY:  Right: 3 - Probably Benign  Overall: 3 - Probably Benign    Discussion/Summary:   Right breast calcifications  we did discuss with the patient in detail about the types of calcifications and further management  Next diagnostic mammogram is scheduled in 2 months     She understand and agree to follow up after the mammogram   All patient's questions answered to her satisfaction    Advance Care Planning/Advance Directives: Lloyd Morales MD discussed the disease status, and treatment plans with Yesenia Lemons today 05/05/21 and will follow-up with the patient

## 2021-05-05 NOTE — ASSESSMENT & PLAN NOTE
Mammogram films were reviewed by myself and noted to be most likely benign calcification I agree with the radiologist it is better to repeat her diagnostic mammogram in 6 months from her last diagnostic mammogram

## 2021-07-15 ENCOUNTER — HOSPITAL ENCOUNTER (OUTPATIENT)
Dept: MAMMOGRAPHY | Facility: CLINIC | Age: 41
Discharge: HOME | End: 2021-07-15
Payer: COMMERCIAL

## 2021-07-15 VITALS — BODY MASS INDEX: 32.44 KG/M2 | HEIGHT: 64 IN | WEIGHT: 190 LBS

## 2021-07-15 DIAGNOSIS — R92.8 ABNORMAL MAMMOGRAM: ICD-10-CM

## 2021-07-15 PROCEDURE — 77065 DX MAMMO INCL CAD UNI: CPT

## 2021-07-21 ENCOUNTER — OFFICE VISIT (OUTPATIENT)
Dept: SURGICAL ONCOLOGY | Facility: CLINIC | Age: 41
End: 2021-07-21
Payer: COMMERCIAL

## 2021-07-21 VITALS
OXYGEN SATURATION: 98 % | HEART RATE: 72 BPM | TEMPERATURE: 97.7 F | HEIGHT: 64 IN | BODY MASS INDEX: 32.78 KG/M2 | SYSTOLIC BLOOD PRESSURE: 112 MMHG | RESPIRATION RATE: 16 BRPM | WEIGHT: 192 LBS | DIASTOLIC BLOOD PRESSURE: 70 MMHG

## 2021-07-21 DIAGNOSIS — R92.1 BREAST CALCIFICATIONS ON MAMMOGRAM: Primary | ICD-10-CM

## 2021-07-21 PROCEDURE — 99215 OFFICE O/P EST HI 40 MIN: CPT | Performed by: SURGERY

## 2021-07-21 NOTE — PROGRESS NOTES
Surgical Oncology Follow Up  [facility] [address] [facility] [address] [address] [phone]    Marysol Coburn  1980  [id number]      Chief Complaint   Patient presents with    Follow-up        Assessment & Plan:   She is here for follow-up with repeat 6 months repeat mammogram initially observed right breast benign calcification read as BI-RADS 3  Most recent mammogram being on July 15th and read as BI-RADS 2  We did discuss with her is her high risk for breast cancer  I also encouraged her to undergo self her routine monthly breast examination  She was told to call us with any questions or concerns  Otherwise I will see her in 1 year after the mammogram     Cancer History:     Oncology History    No history exists  Interval History:   Overall she is doing well since her last visit  She she is here with it diagnostic right breast mammogram for benign calcifications  She denies of any breast related complaints  Review of Systems:   Review of Systems   Constitutional: Negative for chills and fever  HENT: Negative for ear pain and sore throat  Eyes: Negative for pain and visual disturbance  Respiratory: Negative for cough and shortness of breath  Cardiovascular: Negative for chest pain and palpitations  Gastrointestinal: Negative for abdominal pain and vomiting  Genitourinary: Negative for dysuria and hematuria  Musculoskeletal: Negative for arthralgias and back pain  Skin: Negative for color change and rash  Neurological: Negative for seizures and syncope  All other systems reviewed and are negative        Past Medical History     Patient Active Problem List   Diagnosis    Breast calcifications on mammogram     Past Medical History:   Diagnosis Date    Depression     Disease of thyroid gland     WPW (Holley-Parkinson-White syndrome)      Past Surgical History:   Procedure Laterality Date    ABLATION RADIO FREQUENCY ATRIAL (MAZE/CRYOABLATION)      ANKLE SURGERY      CERVICAL CONE BIOPSY      CHOLECYSTECTOMY      KNEE SURGERY      acl    THYROIDECTOMY       Family History   Problem Relation Age of Onset    Stomach cancer Mother     Sarcoidosis Father     Diabetes Sister     No Known Problems Maternal Grandmother     Breast cancer Paternal Grandmother 79    No Known Problems Paternal Aunt      Social History     Socioeconomic History    Marital status: /Civil Union     Spouse name: Not on file    Number of children: Not on file    Years of education: Not on file    Highest education level: Not on file   Occupational History    Not on file   Tobacco Use    Smoking status: Never Smoker    Smokeless tobacco: Never Used   Vaping Use    Vaping Use: Never used   Substance and Sexual Activity    Alcohol use: Yes    Drug use: No    Sexual activity: Yes     Partners: Male     Birth control/protection: OCP     Comment: Sronyx   Other Topics Concern    Not on file   Social History Narrative    Not on file     Social Determinants of Health     Financial Resource Strain:     Difficulty of Paying Living Expenses:    Food Insecurity:     Worried About Running Out of Food in the Last Year:     [address] in the Last Year:    Transportation Needs:     Lack of Transportation (Medical):      Lack of Transportation (Non-Medical):    Physical Activity:     Days of Exercise per Week:     Minutes of Exercise per Session:    Stress:     Feeling of Stress :    Social Connections:     Frequency of Communication with Friends and Family:     Frequency of Social Gatherings with Friends and Family:     Attends Jewish Services:     Active Member of Clubs or Organizations:     Attends Club or Organization Meetings:     Marital Status:    Intimate Partner Violence:     Fear of Current or Ex-Partner:     Emotionally Abused:     Physically Abused:     Sexually Abused:        Current Outpatient Medications:     colestipol (COLESTID) 1 g tablet, TAKE 1 TABLET (1 G TOTAL) BY MOUTH 2 (TWO) TIMES A DAY, Disp: 180 tablet, Rfl: 1    [employer] FIBER GUMMIES PO, Take by mouth, Disp: , Rfl:     esomeprazole (NexIUM) 40 MG capsule, TAKE 1 CAPSULE (40 MG TOTAL) BY MOUTH 2 (TWO) TIMES A DAY BEFORE MEALS, Disp: 180 capsule, Rfl: 1    levonorgestrel-ethinyl estradiol (Sronyx) 0 1-20 MG-MCG per tablet, Take 1 tablet by mouth daily, Disp: 84 tablet, Rfl: 3    Probiotic Product (PROBIOTIC-10 PO), Take by mouth, Disp: , Rfl:     Synthroid 175 MCG tablet, TAKE 1 TABLET EVERY MORNING   Jazmine Martinez PER MD MUST GET BLOODWORK DONE, Disp: , Rfl:   Allergies   Allergen Reactions    Cephalosporins Hives    Penicillins Hives    Sulfa Antibiotics Hives       Physical Exam:     Vitals:    07/21/21 0833   BP: 112/70   Pulse: 72   Resp: 16   Temp: 97 7 °F (36 5 °C)   SpO2: 98%     Physical Exam  Constitutional:       Appearance: Normal appearance  HENT:      Head: Normocephalic and atraumatic  Nose: Nose normal       Mouth/Throat:      Mouth: Mucous membranes are moist    Eyes:      Pupils: Pupils are equal, round, and reactive to light  Cardiovascular:      Rate and Rhythm: Normal rate  Pulses: Normal pulses  Heart sounds: Normal heart sounds  Pulmonary:      Effort: Pulmonary effort is normal       Breath sounds: Normal breath sounds  Chest:      Comments: The bilateral Breast(s) were examined  There was not any sign of an inverted nipple, mass, nipple discharge, skin changes or tenderness  The bilateral  breast(s) were examined in the sitting and supine position  There are not any worrisome skin changes, tenderness, nipple changes, swelling ,bleeding or evidence of mass/s in all four quadrants  Kiara survey demonstrated that there is not any evidence of any clinically suspicious axillary, pectoral or supraclavicular lymph nodes    Abdominal:      General: Bowel sounds are normal  Palpations: Abdomen is soft  Musculoskeletal:         General: Normal range of motion  Cervical back: Normal range of motion and neck supple  Skin:     General: Skin is warm  Neurological:      General: No focal deficit present  Mental Status: She is alert and oriented to person, place, and time  Psychiatric:         Mood and Affect: Mood normal          Behavior: Behavior normal          Thought Content: Thought content normal          Judgment: Judgment normal            Results & Discussion:   Mammogram(7/15/21): FINDINGS: diagnostic  RIGHT  1) CALCIFICATIONS:  The grouped calcifications in the upper outer quadrant, middle depth, 7 cm from the nipple layer on the true lateral view, consistent with benign milk of calcium  These do not appear significantly changed compared to the prior study  No additional dedicated follow-up imaging is indicated        IMPRESSION:   Right breast calcifications are benign in appearance  Patient can return to routine screening mammography      This patient has been identified as being at elevated risk for development of breast cancer based on the Tyrer-Cuzick model  She may benefit from supplemental screening      ASSESSMENT/BI-RADS CATEGORY:  Right: 2 - Benign  Overall: 2 - Benign  I did review  the films and did discussed in detail nature of breast calcifications  she understands and  agrees   All patient questions were answered  Advance Care Planning/Advance Directives: Crystal Christiansen MD discussed the disease status with Yesenia benavidez 07/21/21  treatment plans and follow-up with the patient

## 2021-11-26 DIAGNOSIS — Z01.419 ENCNTR FOR GYN EXAM (GENERAL) (ROUTINE) W/O ABN FINDINGS: ICD-10-CM

## 2021-11-26 RX ORDER — LEVONORGESTREL AND ETHINYL ESTRADIOL 0.1-0.02MG
1 KIT ORAL DAILY
Qty: 84 TABLET | Refills: 0 | Status: SHIPPED | OUTPATIENT
Start: 2021-11-26 | End: 2021-12-10 | Stop reason: SDUPTHER

## 2021-12-10 ENCOUNTER — ANNUAL EXAM (OUTPATIENT)
Dept: OBGYN CLINIC | Facility: CLINIC | Age: 41
End: 2021-12-10
Payer: COMMERCIAL

## 2021-12-10 VITALS
BODY MASS INDEX: 32.95 KG/M2 | WEIGHT: 193 LBS | SYSTOLIC BLOOD PRESSURE: 118 MMHG | DIASTOLIC BLOOD PRESSURE: 76 MMHG | HEIGHT: 64 IN

## 2021-12-10 DIAGNOSIS — Z12.31 ENCOUNTER FOR SCREENING MAMMOGRAM FOR MALIGNANT NEOPLASM OF BREAST: ICD-10-CM

## 2021-12-10 DIAGNOSIS — Z01.419 ENCOUNTER FOR ANNUAL ROUTINE GYNECOLOGICAL EXAMINATION: Primary | ICD-10-CM

## 2021-12-10 DIAGNOSIS — Z01.419 ENCNTR FOR GYN EXAM (GENERAL) (ROUTINE) W/O ABN FINDINGS: ICD-10-CM

## 2021-12-10 PROBLEM — F41.1 GENERALIZED ANXIETY DISORDER: Status: ACTIVE | Noted: 2021-09-14

## 2021-12-10 PROBLEM — E66.9 OBESITY: Status: ACTIVE | Noted: 2020-08-26

## 2021-12-10 PROBLEM — M22.40 CHONDROMALACIA PATELLAE: Status: ACTIVE | Noted: 2020-08-26

## 2021-12-10 PROBLEM — F32.4 MAJOR DEPRESSIVE DISORDER WITH SINGLE EPISODE, IN PARTIAL REMISSION (HCC): Status: ACTIVE | Noted: 2021-10-21

## 2021-12-10 PROBLEM — K21.00 GASTROESOPHAGEAL REFLUX DISEASE WITH ESOPHAGITIS WITHOUT HEMORRHAGE: Status: ACTIVE | Noted: 2021-09-14

## 2021-12-10 PROBLEM — E03.9 HYPOTHYROID: Status: ACTIVE | Noted: 2020-08-26

## 2021-12-10 PROCEDURE — 99386 PREV VISIT NEW AGE 40-64: CPT | Performed by: PHYSICIAN ASSISTANT

## 2021-12-10 RX ORDER — FAMOTIDINE 20 MG
1 TABLET ORAL
COMMUNITY

## 2021-12-10 RX ORDER — LEVONORGESTREL AND ETHINYL ESTRADIOL 0.1-0.02MG
1 KIT ORAL DAILY
Qty: 84 TABLET | Refills: 3 | Status: SHIPPED | OUTPATIENT
Start: 2021-12-10 | End: 2022-03-04

## 2022-01-24 DIAGNOSIS — R11.0 NAUSEA: ICD-10-CM

## 2022-01-24 DIAGNOSIS — K21.9 GASTROESOPHAGEAL REFLUX DISEASE WITHOUT ESOPHAGITIS: ICD-10-CM

## 2022-01-25 RX ORDER — MONTELUKAST SODIUM 4 MG/1
1 TABLET, CHEWABLE ORAL 2 TIMES DAILY
Qty: 180 TABLET | Refills: 0 | Status: SHIPPED | OUTPATIENT
Start: 2022-01-25 | End: 2022-05-01

## 2022-01-31 DIAGNOSIS — K21.9 GASTROESOPHAGEAL REFLUX DISEASE WITHOUT ESOPHAGITIS: ICD-10-CM

## 2022-01-31 DIAGNOSIS — Z01.419 ENCNTR FOR GYN EXAM (GENERAL) (ROUTINE) W/O ABN FINDINGS: ICD-10-CM

## 2022-01-31 DIAGNOSIS — R11.0 NAUSEA: ICD-10-CM

## 2022-01-31 RX ORDER — LEVONORGESTREL AND ETHINYL ESTRADIOL 0.1-0.02MG
1 KIT ORAL DAILY
Qty: 84 TABLET | Refills: 0 | Status: CANCELLED | OUTPATIENT
Start: 2022-01-31 | End: 2022-04-25

## 2022-02-07 DIAGNOSIS — R11.0 NAUSEA: ICD-10-CM

## 2022-02-07 DIAGNOSIS — K21.9 GASTROESOPHAGEAL REFLUX DISEASE WITHOUT ESOPHAGITIS: ICD-10-CM

## 2022-02-07 RX ORDER — ESOMEPRAZOLE MAGNESIUM 40 MG/1
40 CAPSULE, DELAYED RELEASE ORAL
Qty: 60 CAPSULE | Refills: 0 | Status: SHIPPED | OUTPATIENT
Start: 2022-02-07 | End: 2022-02-21

## 2022-02-14 NOTE — TELEPHONE ENCOUNTER
Tried to do auth on [employer] could not be validated  LMOM for pt to call office with prescription insurance information

## 2022-02-16 ENCOUNTER — TELEPHONE (OUTPATIENT)
Dept: GASTROENTEROLOGY | Facility: CLINIC | Age: 42
End: 2022-02-16

## 2022-02-16 ENCOUNTER — TREATMENT (OUTPATIENT)
Dept: GASTROENTEROLOGY | Facility: CLINIC | Age: 42
End: 2022-02-16

## 2022-02-16 DIAGNOSIS — K21.9 GASTROESOPHAGEAL REFLUX DISEASE WITHOUT ESOPHAGITIS: Primary | ICD-10-CM

## 2022-02-16 RX ORDER — OMEPRAZOLE 40 MG/1
40 CAPSULE, DELAYED RELEASE ORAL DAILY
Qty: 31 CAPSULE | Refills: 6 | Status: SHIPPED | OUTPATIENT
Start: 2022-02-16

## 2022-02-16 NOTE — TELEPHONE ENCOUNTER
Iker pt-  Patient is having an issue with her insurance     She needs omeprazole) can she take OTC Nexium for now until the insurance gets corrected?   Please phone [phone]

## 2022-02-21 DIAGNOSIS — K21.9 GASTROESOPHAGEAL REFLUX DISEASE WITHOUT ESOPHAGITIS: ICD-10-CM

## 2022-02-21 DIAGNOSIS — R11.0 NAUSEA: ICD-10-CM

## 2022-02-21 RX ORDER — ESOMEPRAZOLE MAGNESIUM 40 MG/1
CAPSULE, DELAYED RELEASE ORAL
Qty: 60 CAPSULE | Refills: 0 | Status: SHIPPED | OUTPATIENT
Start: 2022-02-21 | End: 2022-02-22

## 2022-02-22 DIAGNOSIS — K21.9 GASTROESOPHAGEAL REFLUX DISEASE WITHOUT ESOPHAGITIS: ICD-10-CM

## 2022-02-22 DIAGNOSIS — R11.0 NAUSEA: ICD-10-CM

## 2022-02-22 RX ORDER — ESOMEPRAZOLE MAGNESIUM 40 MG/1
CAPSULE, DELAYED RELEASE ORAL
Qty: 60 CAPSULE | Refills: 0 | Status: SHIPPED | OUTPATIENT
Start: 2022-02-22

## 2022-02-22 NOTE — TELEPHONE ENCOUNTER
Attempted to do priro auth for nexium and cannot be located via covermymeds    No current insurance verified in pt's chart    LMOM for pt to call back with prescription insurance information

## 2022-04-07 RX ORDER — ESOMEPRAZOLE MAGNESIUM 40 MG/1
CAPSULE, DELAYED RELEASE ORAL
Qty: 60 CAPSULE | Refills: 0 | OUTPATIENT
Start: 2022-04-07

## 2022-07-19 ENCOUNTER — HOSPITAL ENCOUNTER (OUTPATIENT)
Dept: MAMMOGRAPHY | Facility: CLINIC | Age: 42
Discharge: HOME | End: 2022-07-19
Payer: COMMERCIAL

## 2022-07-19 VITALS — BODY MASS INDEX: 32.95 KG/M2 | WEIGHT: 193 LBS | HEIGHT: 64 IN

## 2022-07-19 DIAGNOSIS — Z12.31 ENCOUNTER FOR SCREENING MAMMOGRAM FOR MALIGNANT NEOPLASM OF BREAST: ICD-10-CM

## 2022-07-19 PROCEDURE — 77063 BREAST TOMOSYNTHESIS BI: CPT

## 2022-07-19 PROCEDURE — 77067 SCR MAMMO BI INCL CAD: CPT

## 2022-07-21 ENCOUNTER — TELEPHONE (OUTPATIENT)
Dept: SURGICAL ONCOLOGY | Facility: CLINIC | Age: 42
End: 2022-07-21

## 2022-07-21 NOTE — TELEPHONE ENCOUNTER
Left message to reschedule appt with Guy Burris that she cancelled via [address]  Left call back number

## 2022-08-11 DIAGNOSIS — K21.9 GASTROESOPHAGEAL REFLUX DISEASE WITHOUT ESOPHAGITIS: ICD-10-CM

## 2022-08-11 RX ORDER — OMEPRAZOLE 40 MG/1
40 CAPSULE, DELAYED RELEASE ORAL DAILY
Qty: 90 CAPSULE | Refills: 2 | Status: SHIPPED | OUTPATIENT
Start: 2022-08-11

## 2023-01-23 ENCOUNTER — PATIENT MESSAGE (OUTPATIENT)
Dept: OBGYN CLINIC | Facility: CLINIC | Age: 43
End: 2023-01-23

## 2023-01-23 DIAGNOSIS — Z01.419 ENCNTR FOR GYN EXAM (GENERAL) (ROUTINE) W/O ABN FINDINGS: ICD-10-CM

## 2023-01-23 NOTE — PATIENT COMMUNICATION
Patient has annual exam 2/2/23   Patient will need a refill of medication Sronyx sent to patients pharmacy in chart

## 2023-01-24 RX ORDER — LEVONORGESTREL AND ETHINYL ESTRADIOL 0.1-0.02MG
1 KIT ORAL DAILY
Qty: 84 TABLET | Refills: 3 | Status: SHIPPED | OUTPATIENT
Start: 2023-01-24 | End: 2023-02-02 | Stop reason: SDUPTHER

## 2023-02-02 ENCOUNTER — OFFICE VISIT (OUTPATIENT)
Dept: OBGYN CLINIC | Facility: CLINIC | Age: 43
End: 2023-02-02

## 2023-02-02 VITALS
DIASTOLIC BLOOD PRESSURE: 74 MMHG | WEIGHT: 197 LBS | HEIGHT: 64 IN | BODY MASS INDEX: 33.63 KG/M2 | SYSTOLIC BLOOD PRESSURE: 116 MMHG

## 2023-02-02 DIAGNOSIS — Z01.419 WELL WOMAN EXAM WITH ROUTINE GYNECOLOGICAL EXAM: Primary | ICD-10-CM

## 2023-02-02 DIAGNOSIS — Z01.419 ENCNTR FOR GYN EXAM (GENERAL) (ROUTINE) W/O ABN FINDINGS: ICD-10-CM

## 2023-02-02 DIAGNOSIS — Z12.4 ENCOUNTER FOR SCREENING FOR MALIGNANT NEOPLASM OF CERVIX: ICD-10-CM

## 2023-02-02 DIAGNOSIS — Z11.51 SCREENING FOR HPV (HUMAN PAPILLOMAVIRUS): ICD-10-CM

## 2023-02-02 DIAGNOSIS — Z12.31 ENCOUNTER FOR SCREENING MAMMOGRAM FOR MALIGNANT NEOPLASM OF BREAST: ICD-10-CM

## 2023-02-02 DIAGNOSIS — Z23 NEED FOR HPV VACCINATION: ICD-10-CM

## 2023-02-02 RX ORDER — DOXYCYCLINE HYCLATE 100 MG
100 TABLET ORAL 2 TIMES DAILY
COMMUNITY
Start: 2023-02-02 | End: 2023-02-09

## 2023-02-02 RX ORDER — BENZONATATE 200 MG/1
200 CAPSULE ORAL 3 TIMES DAILY PRN
COMMUNITY
Start: 2023-02-02 | End: 2023-02-09

## 2023-02-02 RX ORDER — LEVONORGESTREL AND ETHINYL ESTRADIOL 0.1-0.02MG
1 KIT ORAL DAILY
Qty: 84 TABLET | Refills: 3 | Status: SHIPPED | OUTPATIENT
Start: 2023-02-02 | End: 2023-04-27

## 2023-02-02 RX ORDER — FLUTICASONE PROPIONATE 50 MCG
2 SPRAY, SUSPENSION (ML) NASAL DAILY
COMMUNITY
Start: 2023-02-02 | End: 2023-02-12

## 2023-02-02 NOTE — PROGRESS NOTES
ASSESSMENT & PLAN:   Diagnoses and all orders for this visit:    Well woman exam with routine gynecological exam  -     Liquid-based pap, screening    Encounter for screening mammogram for malignant neoplasm of breast  -     Mammo screening bilateral w 3d & cad; Future    Encounter for screening for malignant neoplasm of cervix  -     Liquid-based pap, screening    Screening for HPV (human papillomavirus)  -     Liquid-based pap, screening    Need for HPV vaccination  -     HPV VACCINE 9 VALENT IM    Encntr for gyn exam (general) (routine) w/o abn findings  -     levonorgestrel-ethinyl estradiol (Sronyx) 0 1-20 MG-MCG per tablet; Take 1 tablet by mouth daily    Other orders  -     benzonatate (TESSALON) 200 MG capsule; Take 200 mg by mouth Three times daily as needed  -     doxycycline hyclate (VIBRA-TABS) 100 mg tablet; Take 100 mg by mouth 2 (two) times a day  -     fluticasone (FLONASE) 50 mcg/act nasal spray; 2 sprays into each nostril daily          The following were reviewed in today's visit: ASCCP guidelines, Gardisil vaccination, STD testing breast self exam, mammography screening ordered, STD testing, exercise and healthy diet  Patient to return to office in yearly for annual exam      All questions have been answered to her satisfaction  CC:  Annual Gynecologic Examination  Chief Complaint   Patient presents with   • Gynecologic Exam     Patient is here today for her yearly exam, pap due today(11/12/20-wnl), mammo order placed  Patient would like to discuss change in cycle flow and hot flashes  • Injections     1st Gardasil vaccine        HPI: Onel Hurtado is a 43 y o  [id number] who presents for annual gynecologic examination  She has the following concerns:  Patient reports hot flashes have been happening nightly where she wakes up sweating  She also reports the periods have been much lighter         Health Maintenance:    Exercise: always on her feet  Breast exams/breast awareness: yes  Diet: well balanced diet  Last mammogram: 2022      Past Medical History:   Diagnosis Date   • Abnormal Pap smear of cervix     Early 20's    • Depression    • Disease of thyroid gland    • Uterine anomaly     history of uterine septum   • WPW (Holley-Parkinson-White syndrome)        Past Surgical History:   Procedure Laterality Date   • ABLATION RADIO FREQUENCY ATRIAL (MAZE/CRYOABLATION)     • ANKLE SURGERY     • CERVICAL CONE BIOPSY     • CHOLECYSTECTOMY     • KNEE SURGERY      acl   • MD CONIZATION CERVIX W/WO D&C RPR KNIFE/LASER     • THYROIDECTOMY         Past OB/Gyn History:   Patient's last menstrual period was 01/16/2023 (approximate)  Last Pap: 2020 : no abnormalities  History of abnormal Pap smear: Yes   HPV vaccine completed: No - would like to start today    Patient is currently sexually active     STD testing: no  Current contraception: NuvaRing vaginal inserts      Family History  Family History   Problem Relation Age of Onset   • Stomach cancer Mother    • Sarcoidosis Father    • Diabetes Sister    • No Known Problems Maternal Grandmother    • Breast cancer Paternal Grandmother 79   • No Known Problems Paternal Aunt        Family history of uterine or ovarian cancer: no  Family history of breast cancer: yes  Family history of colon cancer: no    Social History:  Social History     Socioeconomic History   • Marital status: /Civil Union     Spouse name: Not on file   • Number of children: Not on file   • Years of education: Not on file   • Highest education level: Not on file   Occupational History   • Not on file   Tobacco Use   • Smoking status: Never   • Smokeless tobacco: Never   Vaping Use   • Vaping Use: Never used   Substance and Sexual Activity   • Alcohol use: Yes     Comment: social    • Drug use: No   • Sexual activity: Yes     Partners: Male     Birth control/protection: OCP     Comment: Sronyx   Other Topics Concern   • Not on file   Social History Narrative   • Not on file Social Determinants of Health     Financial Resource Strain: Not on file   Food Insecurity: Not on file   Transportation Needs: Not on file   Physical Activity: Not on file   Stress: Not on file   Social Connections: Not on file   Intimate Partner Violence: Not on file   Housing Stability: Not on file     Domestic violence screen: negative      Allergies: Allergies   Allergen Reactions   • Buspirone Other (See Comments)     MAKES HER JITTERY   • Cephalosporins Hives   • Penicillins Hives   • Sulfa Antibiotics Hives       Medications:    Current Outpatient Medications:   •  benzonatate (TESSALON) 200 MG capsule, Take 200 mg by mouth Three times daily as needed, Disp: , Rfl:   •  colestipol (COLESTID) 1 g tablet, TAKE 1 TABLET BY MOUTH 2 TIMES A DAY , Disp: 180 tablet, Rfl: 0  •  [employer] FIBER GUMMIES PO, Take by mouth, Disp: , Rfl:   •  doxycycline hyclate (VIBRA-TABS) 100 mg tablet, Take 100 mg by mouth 2 (two) times a day, Disp: , Rfl:   •  esomeprazole (NexIUM) 40 MG capsule, TAKE 1 CAPSULE BY MOUTH 2 TIMES A DAY BEFORE MEALS , Disp: 60 capsule, Rfl: 0  •  fluticasone (FLONASE) 50 mcg/act nasal spray, 2 sprays into each nostril daily, Disp: , Rfl:   •  levonorgestrel-ethinyl estradiol (Sronyx) 0 1-20 MG-MCG per tablet, Take 1 tablet by mouth daily, Disp: 84 tablet, Rfl: 3  •  omeprazole (PriLOSEC) 40 MG capsule, TAKE 1 CAPSULE (40 MG TOTAL) BY MOUTH DAILY  , Disp: 90 capsule, Rfl: 2  •  Probiotic Product (PROBIOTIC-10 PO), Take by mouth, Disp: , Rfl:   •  sertraline (ZOLOFT) 50 mg tablet, Take 50 mg by mouth daily, Disp: , Rfl:   •  Synthroid 175 MCG tablet, TAKE 1 TABLET EVERY MORNING   Jonathon Robles PER MD MUST GET BLOODWORK DONE, Disp: , Rfl:   •  Vitamin D, Cholecalciferol, 25 MCG (1000 UT) CAPS, Take 1 capsule by mouth, Disp: , Rfl:     Review of Systems:  Review of Systems   Constitutional: Negative for chills, fever and unexpected weight change  Respiratory: Negative for shortness of breath      Cardiovascular: Negative for chest pain  Gastrointestinal: Negative for abdominal pain, diarrhea, nausea and vomiting  Skin: Negative for rash  Psychiatric/Behavioral: Negative for dysphoric mood  The patient is not nervous/anxious  Physical Exam:  /74 (BP Location: Right arm, Patient Position: Sitting, Cuff Size: Large)   Ht 5' 4" (1 626 m)   Wt 89 4 kg (197 lb)   LMP 01/16/2023 (Approximate)   BMI 33 81 kg/m²    Physical Exam  Constitutional:       Appearance: Normal appearance  Genitourinary:      Vulva and urethral meatus normal       No lesions in the vagina  Right Labia: No rash or lesions  Left Labia: No lesions or rash  No vaginal discharge, erythema or bleeding  Right Adnexa: not tender and no mass present  Left Adnexa: not tender and no mass present  Adnexa exam comments: Exam limited by habitus  No cervical discharge or lesion  Uterus is not tender  Uterus exam comments: Exam limited by habitus  Breasts:     Breasts are symmetrical       Right: No mass or skin change  Left: No mass or skin change  HENT:      Head: Normocephalic and atraumatic  Cardiovascular:      Rate and Rhythm: Normal rate and regular rhythm  Heart sounds: Normal heart sounds  No murmur heard  No friction rub  No gallop  Pulmonary:      Effort: Pulmonary effort is normal       Breath sounds: Normal breath sounds  No wheezing, rhonchi or rales  Abdominal:      General: Abdomen is flat  There is no distension  Palpations: Abdomen is soft  Tenderness: There is no abdominal tenderness  Musculoskeletal:      Cervical back: Neck supple  Lymphadenopathy:      Upper Body:      Right upper body: No axillary adenopathy  Left upper body: No axillary adenopathy  Neurological:      General: No focal deficit present  Mental Status: She is alert  Skin:     General: Skin is warm and dry     Psychiatric:         Mood and Affect: Mood normal  Behavior: Behavior normal    Vitals reviewed

## 2023-02-08 LAB
LAB AP GYN PRIMARY INTERPRETATION: NORMAL
Lab: NORMAL

## 2023-02-21 ENCOUNTER — HOSPITAL ENCOUNTER (EMERGENCY)
Facility: HOSPITAL | Age: 43
Discharge: HOME | End: 2023-02-21
Attending: EMERGENCY MEDICINE

## 2023-02-21 VITALS
SYSTOLIC BLOOD PRESSURE: 115 MMHG | RESPIRATION RATE: 18 BRPM | OXYGEN SATURATION: 99 % | DIASTOLIC BLOOD PRESSURE: 76 MMHG | TEMPERATURE: 97.3 F | HEART RATE: 101 BPM

## 2023-02-21 DIAGNOSIS — R19.7 ACUTE DIARRHEA: Primary | ICD-10-CM

## 2023-02-21 LAB
ALBUMIN SERPL BCP-MCNC: 4.3 G/DL (ref 3.5–5)
ALP SERPL-CCNC: 61 U/L (ref 34–104)
ALT SERPL W P-5'-P-CCNC: 51 U/L (ref 7–52)
ANION GAP SERPL CALCULATED.3IONS-SCNC: 10 MMOL/L (ref 4–13)
AST SERPL W P-5'-P-CCNC: 42 U/L (ref 13–39)
BASOPHILS # BLD AUTO: 0.04 THOUSANDS/ÂΜL (ref 0–0.1)
BASOPHILS NFR BLD AUTO: 1 % (ref 0–1)
BILIRUB SERPL-MCNC: 0.66 MG/DL (ref 0.2–1)
BUN SERPL-MCNC: 13 MG/DL (ref 5–25)
CALCIUM SERPL-MCNC: 9.7 MG/DL (ref 8.4–10.2)
CHLORIDE SERPL-SCNC: 103 MMOL/L (ref 96–108)
CO2 SERPL-SCNC: 24 MMOL/L (ref 21–32)
CREAT SERPL-MCNC: 0.9 MG/DL (ref 0.6–1.3)
EOSINOPHIL # BLD AUTO: 0.09 THOUSAND/ÂΜL (ref 0–0.61)
EOSINOPHIL NFR BLD AUTO: 2 % (ref 0–6)
ERYTHROCYTE [DISTWIDTH] IN BLOOD BY AUTOMATED COUNT: 12.5 % (ref 11.6–15.1)
GFR SERPL CREATININE-BSD FRML MDRD: 79 ML/MIN/1.73SQ M
GLUCOSE SERPL-MCNC: 120 MG/DL (ref 65–140)
HCT VFR BLD AUTO: 47.7 % (ref 34.8–46.1)
HGB BLD-MCNC: 16 G/DL (ref 11.5–15.4)
IMM GRANULOCYTES # BLD AUTO: 0.03 THOUSAND/UL (ref 0–0.2)
IMM GRANULOCYTES NFR BLD AUTO: 1 % (ref 0–2)
LIPASE SERPL-CCNC: 13 U/L (ref 11–82)
LYMPHOCYTES # BLD AUTO: 1.32 THOUSANDS/ÂΜL (ref 0.6–4.47)
LYMPHOCYTES NFR BLD AUTO: 26 % (ref 14–44)
MAGNESIUM SERPL-MCNC: 1.8 MG/DL (ref 1.9–2.7)
MCH RBC QN AUTO: 29.5 PG (ref 26.8–34.3)
MCHC RBC AUTO-ENTMCNC: 33.5 G/DL (ref 31.4–37.4)
MCV RBC AUTO: 88 FL (ref 82–98)
MONOCYTES # BLD AUTO: 0.46 THOUSAND/ÂΜL (ref 0.17–1.22)
MONOCYTES NFR BLD AUTO: 9 % (ref 4–12)
NEUTROPHILS # BLD AUTO: 3.24 THOUSANDS/ÂΜL (ref 1.85–7.62)
NEUTS SEG NFR BLD AUTO: 61 % (ref 43–75)
NRBC BLD AUTO-RTO: 0 /100 WBCS
PLATELET # BLD AUTO: 252 THOUSANDS/UL (ref 149–390)
PMV BLD AUTO: 9.4 FL (ref 8.9–12.7)
POTASSIUM SERPL-SCNC: 3.6 MMOL/L (ref 3.5–5.3)
PROT SERPL-MCNC: 7.8 G/DL (ref 6.4–8.4)
RBC # BLD AUTO: 5.43 MILLION/UL (ref 3.81–5.12)
SODIUM SERPL-SCNC: 137 MMOL/L (ref 135–147)
WBC # BLD AUTO: 5.18 THOUSAND/UL (ref 4.31–10.16)

## 2023-02-21 RX ORDER — ONDANSETRON 4 MG/1
4 TABLET, ORALLY DISINTEGRATING ORAL EVERY 6 HOURS PRN
Qty: 20 TABLET | Refills: 0 | Status: SHIPPED | OUTPATIENT
Start: 2023-02-21

## 2023-02-21 RX ORDER — DICYCLOMINE HCL 20 MG
20 TABLET ORAL EVERY 6 HOURS PRN
Qty: 20 TABLET | Refills: 0 | Status: SHIPPED | OUTPATIENT
Start: 2023-02-21

## 2023-02-21 RX ORDER — DICYCLOMINE HCL 20 MG
20 TABLET ORAL ONCE
Status: COMPLETED | OUTPATIENT
Start: 2023-02-21 | End: 2023-02-21

## 2023-02-21 RX ORDER — ONDANSETRON 2 MG/ML
4 INJECTION INTRAMUSCULAR; INTRAVENOUS ONCE
Status: COMPLETED | OUTPATIENT
Start: 2023-02-21 | End: 2023-02-21

## 2023-02-21 RX ORDER — MAGNESIUM SULFATE 1 G/100ML
1 INJECTION INTRAVENOUS ONCE
Status: COMPLETED | OUTPATIENT
Start: 2023-02-21 | End: 2023-02-21

## 2023-02-21 RX ADMIN — SODIUM CHLORIDE 1000 ML: 0.9 INJECTION, SOLUTION INTRAVENOUS at 10:17

## 2023-02-21 RX ADMIN — ONDANSETRON 4 MG: 2 INJECTION INTRAMUSCULAR; INTRAVENOUS at 10:14

## 2023-02-21 RX ADMIN — MAGNESIUM SULFATE HEPTAHYDRATE 1 G: 1 INJECTION, SOLUTION INTRAVENOUS at 11:02

## 2023-02-21 RX ADMIN — DICYCLOMINE HYDROCHLORIDE 20 MG: 20 TABLET ORAL at 10:14

## 2023-02-21 NOTE — ED PROVIDER NOTES
History  Chief Complaint   Patient presents with   • Vomiting   • Diarrhea     Pt reports N/V/D for the past three days  37yo female with a history of hypothyroidism, depression, GERD, and WPW s/p ablation presenting for evaluation of diarrhea x 3 days  She reports watery yellow bowel movements about every hour for the past several days  She also has nausea but has only vomited about 3x  She reports generalized abdominal cramping and "gurgling " She ate a restaurant the day before her symptoms started  Several friends also had diarrhea but they are improving  She has been using bismuth OTC  She denies any fevers, chills, hematochezia  Of note, she was recently on antibiotics a few weeks ago when she had COVID  History provided by:  Patient   used: No    Diarrhea  Quality:  Watery  Severity:  Moderate  Number of episodes:  20+  Duration:  3 days  Timing:  Constant  Progression:  Unchanged  Relieved by:  Nothing  Associated symptoms: abdominal pain and vomiting    Associated symptoms: no chills, no fever and no URI    Risk factors: recent antibiotic use, sick contacts and suspect food intake    Risk factors: no travel to endemic areas        Prior to Admission Medications   Prescriptions Last Dose Informant Patient Reported? Taking? [employer] FIBER GUMMIES PO   Yes No   Sig: Take by mouth   Probiotic Product (PROBIOTIC-10 PO)   Yes No   Sig: Take by mouth   Synthroid 175 MCG tablet   Yes No   Sig: TAKE 1 TABLET EVERY MORNING   Lary Smith PER MD MUST GET BLOODWORK DONE   Vitamin D, Cholecalciferol, 25 MCG (1000 UT) CAPS   Yes No   Sig: Take 1 capsule by mouth   colestipol (COLESTID) 1 g tablet   No No   Sig: TAKE 1 TABLET BY MOUTH 2 TIMES A DAY  esomeprazole (NexIUM) 40 MG capsule   No No   Sig: TAKE 1 CAPSULE BY MOUTH 2 TIMES A DAY BEFORE MEALS     fluticasone (FLONASE) 50 mcg/act nasal spray   Yes No   Si sprays into each nostril daily   levonorgestrel-ethinyl estradiol (Sronyx) 0 1-20 MG-MCG per tablet   No No   Sig: Take 1 tablet by mouth daily   omeprazole (PriLOSEC) 40 MG capsule   No No   Sig: TAKE 1 CAPSULE (40 MG TOTAL) BY MOUTH DAILY  sertraline (ZOLOFT) 50 mg tablet   Yes No   Sig: Take 50 mg by mouth daily      Facility-Administered Medications: None       Past Medical History:   Diagnosis Date   • Abnormal Pap smear of cervix     Early 20's    • Depression    • Disease of thyroid gland    • Uterine anomaly     history of uterine septum   • WPW (Holley-Parkinson-White syndrome)        Past Surgical History:   Procedure Laterality Date   • ABLATION RADIO FREQUENCY ATRIAL (MAZE/CRYOABLATION)     • ANKLE SURGERY     • CERVICAL CONE BIOPSY     • CHOLECYSTECTOMY     • KNEE SURGERY      acl   • CO CONIZATION CERVIX W/WO D&C RPR KNIFE/LASER     • THYROIDECTOMY         Family History   Problem Relation Age of Onset   • Stomach cancer Mother    • Sarcoidosis Father    • Diabetes Sister    • No Known Problems Maternal Grandmother    • Breast cancer Paternal Grandmother 79   • No Known Problems Paternal Aunt      I have reviewed and agree with the history as documented  E-Cigarette/Vaping   • E-Cigarette Use Never User      E-Cigarette/Vaping Substances   • Nicotine No    • THC No    • CBD No    • Flavoring No    • Other No    • Unknown No      Social History     Tobacco Use   • Smoking status: Never   • Smokeless tobacco: Never   Vaping Use   • Vaping Use: Never used   Substance Use Topics   • Alcohol use: Yes     Comment: social    • Drug use: No       Review of Systems   Constitutional: Negative for chills and fever  Eyes: Negative for discharge and redness  Respiratory: Negative for shortness of breath and stridor  Gastrointestinal: Positive for abdominal pain, diarrhea, nausea and vomiting  Skin: Negative for color change and rash  Psychiatric/Behavioral: Negative for confusion  The patient is not nervous/anxious  All other systems reviewed and are negative        Physical Exam  Physical Exam  Vitals and nursing note reviewed  Constitutional:       General: She is not in acute distress  Appearance: She is well-developed  She is not diaphoretic  HENT:      Head: Normocephalic and atraumatic  Right Ear: External ear normal       Left Ear: External ear normal       Nose: Nose normal    Eyes:      General: No scleral icterus  Right eye: No discharge  Left eye: No discharge  Conjunctiva/sclera: Conjunctivae normal    Cardiovascular:      Rate and Rhythm: Normal rate and regular rhythm  Heart sounds: Normal heart sounds  No murmur heard  Pulmonary:      Effort: Pulmonary effort is normal  No respiratory distress  Breath sounds: Normal breath sounds  No stridor  No wheezing or rales  Abdominal:      General: Bowel sounds are normal  There is no distension  Palpations: Abdomen is soft  Tenderness: There is abdominal tenderness in the right upper quadrant, epigastric area and left upper quadrant  There is no guarding or rebound  Comments: Abdomen soft, non-distended  Minimal tenderness in the upper abdomen  No guarding or rebound  Musculoskeletal:         General: No deformity  Normal range of motion  Cervical back: Normal range of motion and neck supple  Lymphadenopathy:      Cervical: No cervical adenopathy  Skin:     General: Skin is warm and dry  Neurological:      Mental Status: She is alert  She is not disoriented  GCS: GCS eye subscore is 4  GCS verbal subscore is 5  GCS motor subscore is 6     Psychiatric:         Behavior: Behavior normal          Vital Signs  ED Triage Vitals [02/21/23 0901]   Temperature Pulse Respirations Blood Pressure SpO2   (!) 97 3 °F (36 3 °C) 101 18 115/76 99 %      Temp src Heart Rate Source Patient Position - Orthostatic VS BP Location FiO2 (%)   -- -- -- -- --      Pain Score       --           Vitals:    02/21/23 0901   BP: 115/76   Pulse: 101         Visual Acuity      ED Medications  Medications   sodium chloride 0 9 % bolus 1,000 mL (0 mL Intravenous Stopped 2/21/23 1117)   ondansetron (ZOFRAN) injection 4 mg (4 mg Intravenous Given 2/21/23 1014)   dicyclomine (BENTYL) tablet 20 mg (20 mg Oral Given 2/21/23 1014)   magnesium sulfate IVPB (premix) SOLN 1 g (0 g Intravenous Stopped 2/21/23 1202)       Diagnostic Studies  Results Reviewed     Procedure Component Value Units Date/Time    Stool Enteric Bacterial Panel by PCR [[id number]]  (Normal) Collected: 02/21/23 1047    Lab Status: Final result Specimen: Stool from Per Rectum Updated: 02/22/23 1200     Salmonella sp PCR None Detected     Shigella sp/Enteroinvasive E  coli (EIEC) PCR None Detected     Campylobacter sp (jejuni and coli) PCR None Detected     Shiga toxin 1/Shiga toxin 2 genes PCR None Detected    Clostridium difficile toxin by PCR with EIA [[id number]]  (Normal) Collected: 02/21/23 1047    Lab Status: Final result Specimen: Stool from Per Rectum Updated: 02/22/23 0330     C difficile toxin by PCR Negative    Comprehensive metabolic panel [[id number]]  (Abnormal) Collected: 02/21/23 1006    Lab Status: Final result Specimen: Blood Updated: 02/21/23 1034     Sodium 137 mmol/L      Potassium 3 6 mmol/L      Chloride 103 mmol/L      CO2 24 mmol/L      ANION GAP 10 mmol/L      BUN 13 mg/dL      Creatinine 0 90 mg/dL      Glucose 120 mg/dL      Calcium 9 7 mg/dL      AST 42 U/L      ALT 51 U/L      Alkaline Phosphatase 61 U/L      Total Protein 7 8 g/dL      Albumin 4 3 g/dL      Total Bilirubin 0 66 mg/dL      eGFR 79 ml/min/1 73sq m     Narrative:      [place] guidelines for Chronic Kidney Disease (CKD):   •  Stage 1 with normal or high GFR (GFR > 90 mL/min/1 73 square meters)  •  Stage 2 Mild CKD (GFR = 60-89 mL/min/1 73 square meters)  •  Stage 3A Moderate CKD (GFR = 45-59 mL/min/1 73 square meters)  •  Stage 3B Moderate CKD (GFR = 30-44 mL/min/1 73 square meters)  •  Stage 4 Severe CKD (GFR = 15-29 mL/min/1 73 square meters)  •  Stage 5 End Stage CKD (GFR <15 mL/min/1 73 square meters)  Note: GFR calculation is accurate only with a steady state creatinine    Lipase [[id number]]  (Normal) Collected: 02/21/23 1006    Lab Status: Final result Specimen: Blood Updated: 02/21/23 1034     Lipase 13 u/L     Magnesium [[id number]]  (Abnormal) Collected: 02/21/23 1006    Lab Status: Final result Specimen: Blood Updated: 02/21/23 1034     Magnesium 1 8 mg/dL     CBC and differential [[id number]]  (Abnormal) Collected: 02/21/23 1006    Lab Status: Final result Specimen: Blood Updated: 02/21/23 1009     WBC 5 18 Thousand/uL      RBC 5 43 Million/uL      Hemoglobin 16 0 g/dL      Hematocrit 47 7 %      MCV 88 fL      MCH 29 5 pg      MCHC 33 5 g/dL      RDW 12 5 %      MPV 9 4 fL      Platelets 562 Thousands/uL      nRBC 0 /100 WBCs      Neutrophils Relative 61 %      Immat GRANS % 1 %      Lymphocytes Relative 26 %      Monocytes Relative 9 %      Eosinophils Relative 2 %      Basophils Relative 1 %      Neutrophils Absolute 3 24 Thousands/µL      Immature Grans Absolute 0 03 Thousand/uL      Lymphocytes Absolute 1 32 Thousands/µL      Monocytes Absolute 0 46 Thousand/µL      Eosinophils Absolute 0 09 Thousand/µL      Basophils Absolute 0 04 Thousands/µL                  No orders to display              Procedures  Procedures         ED Course                     Medical Decision Making  42yoF presenting for diarrhea x 3 days  Also having abdominal cramping and nausea/vomiting  Ate at a restaurant night before symptoms started  No f/c  No hematochezia  She is afebrile and hemodynamically stable  She is well appearing in no distress  Minimal tenderness in upper abdomen on exam  Differential diagnosis includes but is not limited to: gastroenteritis, food poisoning, Cdiff, dehydration, electrolyte abnormality, ILIR    Initial ED plan: Check abdominal labs, magnesium, and stool studies   Defer imaging as abdominal exam is overall benign  Bentyl, IV Zofran, and fluid bolus for symptoms  Final assessment: Magnesium mildly low at 1 8 which was replaced  Remainder of labs including renal function are stable  She is feeling improved on re-evaluation and is tolerating PO intake  No indication for admission  Stool studies sent and pending  Supportive care discussed including hydration and bland diet  Scripts provided for Bentyl and Zofran  Advised close PCP follow-up  ED return precautions discussed  Patient expressed understanding and is agreeable to plan  Patient discharged in stable condition  Acute diarrhea: acute illness or injury  Amount and/or Complexity of Data Reviewed  Labs: ordered  Risk  Prescription drug management  Disposition  Final diagnoses:   Acute diarrhea     Time reflects when diagnosis was documented in both MDM as applicable and the Disposition within this note     Time User Action Codes Description Comment    2/21/2023 11:50 AM Chiki Painting [R19 7] Acute diarrhea       ED Disposition     ED Disposition   Discharge    Condition   Stable    Date/Time   Tue Feb 21, 2023 11:50 AM    Comment   Yesenia Lemons discharge to home/self care                 Follow-up Information     Follow up With Specialties Details Why Contact Info Additional Information    Andree Gillespie DO General Practice Schedule an appointment as soon as possible for a visit   [address] [facility] [address] Emergency Department Emergency Medicine  If symptoms worsen 34 [facility] 109 [facility] Emergency Department, [address] [place] [address], [id number]          Discharge Medication List as of 2/21/2023 11:51 AM      START taking these medications    Details   dicyclomine (BENTYL) 20 mg tablet Take 1 tablet (20 mg total) by mouth every 6 (six) hours as needed (abdominal cramping), Starting Tue 2/21/2023, Normal      ondansetron (Zofran ODT) 4 mg disintegrating tablet Take 1 tablet (4 mg total) by mouth every 6 (six) hours as needed for nausea or vomiting, Starting Tue 2/21/2023, Normal         CONTINUE these medications which have NOT CHANGED    Details   colestipol (COLESTID) 1 g tablet TAKE 1 TABLET BY MOUTH 2 TIMES A DAY , Normal      [employer] FIBER GUMMIES PO Take by mouth, Historical Med      esomeprazole (NexIUM) 40 MG capsule TAKE 1 CAPSULE BY MOUTH 2 TIMES A DAY BEFORE MEALS , Normal      fluticasone (FLONASE) 50 mcg/act nasal spray 2 sprays into each nostril daily, Starting Thu 2/2/2023, Until [place] 2/12/2023, Historical Med      levonorgestrel-ethinyl estradiol (Sronyx) 0 1-20 MG-MCG per tablet Take 1 tablet by mouth daily, Starting Thu 2/2/2023, Until Thu 4/27/2023, Normal      omeprazole (PriLOSEC) 40 MG capsule TAKE 1 CAPSULE (40 MG TOTAL) BY MOUTH DAILY  , Starting Thu 8/11/2022, Normal      Probiotic Product (PROBIOTIC-10 PO) Take by mouth, Historical Med      sertraline (ZOLOFT) 50 mg tablet Take 50 mg by mouth daily, Historical Med      Synthroid 175 MCG tablet TAKE 1 TABLET EVERY MORNING   Kailey Eaton PER MD MUST GET BLOODWORK DONE, Historical Med      Vitamin D, Cholecalciferol, 25 MCG (1000 UT) CAPS Take 1 capsule by mouth, Historical Med             No discharge procedures on file      PDMP Review     None          ED Provider  Electronically Signed by           Nick Razo PA-C  02/22/23 [id number]

## 2023-02-21 NOTE — DISCHARGE INSTRUCTIONS
Take Bentyl as needed for abdominal cramping  Take Zofran as needed for nausea  Drink plenty of fluids and hydrate  Advance slowly to a bland diet (rice, applesauce, toast, soup)  Please follow-up with your family doctor  Return to the ER with any worsening symptoms

## 2023-02-22 LAB
C DIFF TOX GENS STL QL NAA+PROBE: NEGATIVE
CAMPYLOBACTER DNA SPEC NAA+PROBE: NORMAL
SALMONELLA DNA SPEC QL NAA+PROBE: NORMAL
SHIGA TOXIN STX GENE SPEC NAA+PROBE: NORMAL
SHIGELLA DNA SPEC QL NAA+PROBE: NORMAL

## 2023-04-03 ENCOUNTER — CLINICAL SUPPORT (OUTPATIENT)
Dept: OBGYN CLINIC | Facility: CLINIC | Age: 43
End: 2023-04-03

## 2023-04-03 DIAGNOSIS — Z23 NEED FOR HPV VACCINATION: Primary | ICD-10-CM

## 2023-05-28 DIAGNOSIS — R11.0 NAUSEA: ICD-10-CM

## 2023-05-28 DIAGNOSIS — K21.9 GASTROESOPHAGEAL REFLUX DISEASE WITHOUT ESOPHAGITIS: ICD-10-CM

## 2023-05-30 RX ORDER — MONTELUKAST SODIUM 4 MG/1
TABLET, CHEWABLE ORAL
Qty: 180 TABLET | Refills: 0 | Status: SHIPPED | OUTPATIENT
Start: 2023-05-30

## 2023-06-27 ENCOUNTER — TELEPHONE (OUTPATIENT)
Dept: GASTROENTEROLOGY | Facility: CLINIC | Age: 43
End: 2023-06-27

## 2023-06-27 NOTE — TELEPHONE ENCOUNTER
----- Message from Mikaela Duvall PA-C sent at 6/27/2023  1:42 PM EDT -----  Can we please schedule patient for EGD and colonoscopy with Iker or Edwige? ?!!

## 2023-06-28 ENCOUNTER — PREP FOR PROCEDURE (OUTPATIENT)
Dept: GASTROENTEROLOGY | Facility: CLINIC | Age: 43
End: 2023-06-28

## 2023-06-28 DIAGNOSIS — R19.7 ACUTE DIARRHEA: ICD-10-CM

## 2023-06-28 DIAGNOSIS — R11.0 NAUSEA: Primary | ICD-10-CM

## 2023-06-28 NOTE — TELEPHONE ENCOUNTER
Scheduled date of EGD/colonoscopy (as of today):07/12/2023  Physician performing EGD/colonoscopy: Dr Barbara Rendon  Location of EGD/colonoscopy:[place]  Clearances:  N/a    FYI I did schedule colonoscopy with diagnosis(screening for colon) please verify if that's correct?  I did not see a diagnosis for colonoscopy thank you

## 2023-07-11 RX ORDER — SODIUM CHLORIDE, SODIUM LACTATE, POTASSIUM CHLORIDE, CALCIUM CHLORIDE 600; 310; 30; 20 MG/100ML; MG/100ML; MG/100ML; MG/100ML
125 INJECTION, SOLUTION INTRAVENOUS CONTINUOUS
Status: CANCELLED | OUTPATIENT
Start: 2023-07-11

## 2023-07-12 ENCOUNTER — ANESTHESIA (OUTPATIENT)
Dept: GASTROENTEROLOGY | Facility: HOSPITAL | Age: 43
End: 2023-07-12

## 2023-07-12 ENCOUNTER — HOSPITAL ENCOUNTER (OUTPATIENT)
Dept: GASTROENTEROLOGY | Facility: HOSPITAL | Age: 43
Setting detail: OUTPATIENT SURGERY
Discharge: HOME | End: 2023-07-12
Attending: INTERNAL MEDICINE
Payer: COMMERCIAL

## 2023-07-12 ENCOUNTER — ANESTHESIA EVENT (OUTPATIENT)
Dept: GASTROENTEROLOGY | Facility: HOSPITAL | Age: 43
End: 2023-07-12

## 2023-07-12 VITALS
SYSTOLIC BLOOD PRESSURE: 117 MMHG | HEART RATE: 80 BPM | BODY MASS INDEX: 34.02 KG/M2 | WEIGHT: 199.3 LBS | DIASTOLIC BLOOD PRESSURE: 74 MMHG | TEMPERATURE: 97.8 F | OXYGEN SATURATION: 95 % | RESPIRATION RATE: 16 BRPM | HEIGHT: 64 IN

## 2023-07-12 DIAGNOSIS — K21.9 GASTROESOPHAGEAL REFLUX DISEASE WITHOUT ESOPHAGITIS: ICD-10-CM

## 2023-07-12 DIAGNOSIS — R11.0 NAUSEA: ICD-10-CM

## 2023-07-12 DIAGNOSIS — R19.7 ACUTE DIARRHEA: ICD-10-CM

## 2023-07-12 LAB
EXT PREGNANCY TEST URINE: NEGATIVE
EXT. CONTROL: NORMAL

## 2023-07-12 PROCEDURE — 88342 IMHCHEM/IMCYTCHM 1ST ANTB: CPT | Performed by: PATHOLOGY

## 2023-07-12 PROCEDURE — 81025 URINE PREGNANCY TEST: CPT | Performed by: STUDENT IN AN ORGANIZED HEALTH CARE EDUCATION/TRAINING PROGRAM

## 2023-07-12 PROCEDURE — 88305 TISSUE EXAM BY PATHOLOGIST: CPT | Performed by: PATHOLOGY

## 2023-07-12 RX ORDER — LIDOCAINE HYDROCHLORIDE 20 MG/ML
INJECTION, SOLUTION EPIDURAL; INFILTRATION; INTRACAUDAL; PERINEURAL AS NEEDED
Status: DISCONTINUED | OUTPATIENT
Start: 2023-07-12 | End: 2023-07-12

## 2023-07-12 RX ORDER — PROPOFOL 10 MG/ML
INJECTION, EMULSION INTRAVENOUS AS NEEDED
Status: DISCONTINUED | OUTPATIENT
Start: 2023-07-12 | End: 2023-07-12

## 2023-07-12 RX ORDER — SODIUM CHLORIDE, SODIUM LACTATE, POTASSIUM CHLORIDE, CALCIUM CHLORIDE 600; 310; 30; 20 MG/100ML; MG/100ML; MG/100ML; MG/100ML
125 INJECTION, SOLUTION INTRAVENOUS CONTINUOUS
Status: DISCONTINUED | OUTPATIENT
Start: 2023-07-12 | End: 2023-07-16 | Stop reason: HOSPADM

## 2023-07-12 RX ORDER — SODIUM CHLORIDE, SODIUM LACTATE, POTASSIUM CHLORIDE, CALCIUM CHLORIDE 600; 310; 30; 20 MG/100ML; MG/100ML; MG/100ML; MG/100ML
INJECTION, SOLUTION INTRAVENOUS CONTINUOUS PRN
Status: DISCONTINUED | OUTPATIENT
Start: 2023-07-12 | End: 2023-07-12

## 2023-07-12 RX ADMIN — PROPOFOL 50 MG: 10 INJECTION, EMULSION INTRAVENOUS at 09:11

## 2023-07-12 RX ADMIN — PROPOFOL 150 MG: 10 INJECTION, EMULSION INTRAVENOUS at 09:07

## 2023-07-12 RX ADMIN — SODIUM CHLORIDE, SODIUM LACTATE, POTASSIUM CHLORIDE, AND CALCIUM CHLORIDE: .6; .31; .03; .02 INJECTION, SOLUTION INTRAVENOUS at 09:04

## 2023-07-12 RX ADMIN — LIDOCAINE HYDROCHLORIDE 100 MG: 20 INJECTION, SOLUTION EPIDURAL; INFILTRATION; INTRACAUDAL; PERINEURAL at 09:07

## 2023-07-12 RX ADMIN — PROPOFOL 50 MG: 10 INJECTION, EMULSION INTRAVENOUS at 09:20

## 2023-07-12 RX ADMIN — PROPOFOL 50 MG: 10 INJECTION, EMULSION INTRAVENOUS at 09:15

## 2023-07-12 RX ADMIN — SODIUM CHLORIDE, SODIUM LACTATE, POTASSIUM CHLORIDE, AND CALCIUM CHLORIDE: .6; .31; .03; .02 INJECTION, SOLUTION INTRAVENOUS at 09:05

## 2023-07-12 NOTE — H&P
History and Physical - [facility]  Yesenia Cristo 43 y.o. female MRN: [id number]                  HPI: Derek Salinas is a 43y.o. year old female who presents for EGD/colonoscopy abdominal pain GERD nausea and diarrhea. REVIEW OF SYSTEMS: Per the HPI, and otherwise unremarkable.     Historical Information   Past Medical History:   Diagnosis Date   • Abnormal Pap smear of cervix     Early 20's    • Depression    • Disease of thyroid gland    • GERD (gastroesophageal reflux disease)    • Uterine anomaly     history of uterine septum   • WPW (Holley-Parkinson-White syndrome)      Past Surgical History:   Procedure Laterality Date   • ABLATION RADIO FREQUENCY ATRIAL (MAZE/CRYOABLATION)     • ANKLE SURGERY     • CERVICAL CONE BIOPSY     • CHOLECYSTECTOMY     • KNEE SURGERY      acl   • UT CONIZATION CERVIX W/WO D&C RPR KNIFE/LASER     • THYROIDECTOMY       Social History   Social History     Substance and Sexual Activity   Alcohol Use Yes    Comment: less than weekly     Social History     Substance and Sexual Activity   Drug Use No     Social History     Tobacco Use   Smoking Status Never   Smokeless Tobacco Never     Family History   Problem Relation Age of Onset   • Stomach cancer Mother    • Sarcoidosis Father    • Diabetes Sister    • No Known Problems Maternal Grandmother    • Breast cancer Paternal Grandmother 79   • No Known Problems Paternal Aunt        Meds/Allergies       Current Outpatient Medications:   •  esomeprazole (NexIUM) 40 MG capsule  •  levonorgestrel-ethinyl estradiol (Sronyx) 0.1-20 MG-MCG per tablet  •  sertraline (ZOLOFT) 50 mg tablet  •  Synthroid 175 MCG tablet  •  Vitamin D, Cholecalciferol, 25 MCG (1000 UT) CAPS  •  colestipol (COLESTID) 1 g tablet  •  [employer] FIBER GUMMIES PO  •  omeprazole (PriLOSEC) 40 MG capsule  •  Probiotic Product (PROBIOTIC-10 PO)    Current Facility-Administered Medications:   •  lactated ringers infusion, 125 mL/hr, Intravenous, Continuous    Allergies   Allergen Reactions   • Buspirone Other (See Comments)     MAKES HER JITTERY   • Cephalosporins Hives   • Penicillins Hives   • Sulfa Antibiotics Hives       Objective     /75   Pulse 82   Temp 97.8 °F (36.6 °C) (Temporal)   Resp 17   Ht 5' 4" (1.626 m)   Wt 90.4 kg (199 lb 4.7 oz)   LMP 06/29/2023   SpO2 96%   BMI 34.21 kg/m²       PHYSICAL EXAM    Gen: NAD  Head: NCAT  CV: RRR  CHEST: Clear  ABD: soft, NT/ND  EXT: no edema      ASSESSMENT/PLAN:  Paulo Marquez is a 43y.o. year old female who presents for EGD/colonoscopy abdominal pain GERD nausea and diarrhea. The patient is stable and optimized for the procedure, we reviewed risk and benefits. Risk include but not limited to infection, bleeding, perforation and missing a lesion.

## 2023-07-12 NOTE — ANESTHESIA POSTPROCEDURE EVALUATION
Post-Op Assessment Note    CV Status:  Stable  Pain Score: 0    Pain management: adequate     Mental Status:  Alert and awake   Hydration Status:  Euvolemic   PONV Controlled:  Controlled   Airway Patency:  Patent and adequate   Two or more mitigation strategies used for obstructive sleep apnea   Post Op Vitals Reviewed: Yes      Staff: CRNA         No notable events documented.     /63 (07/12/23 0929)    Temp 97.8 °F (36.6 °C) (07/12/23 0929)    Pulse 92 (07/12/23 0929)   Resp 20 (07/12/23 0929)    SpO2 93 % (07/12/23 0929)

## 2023-07-12 NOTE — ANESTHESIA PREPROCEDURE EVALUATION
Procedure:  EGD  COLONOSCOPY    Relevant Problems   ANESTHESIA (within normal limits)   (-) History of anesthesia complications      CARDIO   (+) Holley-Parkinson-White (WPW) syndrome (s/p ablation)      ENDO   (+) Hypothyroid      GI/HEPATIC  Confirmed NPO appropriate   (+) Gastroesophageal reflux disease with esophagitis without hemorrhage      /RENAL (within normal limits)      HEMATOLOGY (within normal limits)      MUSCULOSKELETAL   (+) Chondromalacia patellae      NEURO/PSYCH   (+) Generalized anxiety disorder   (+) Major depressive disorder with single episode, in partial remission (HCC)      PULMONARY (within normal limits)   (-) Smoking   (-) URI (upper respiratory infection)        Physical Exam    Airway    Mallampati score: II  TM Distance: >3 FB  Neck ROM: full     Dental   No notable dental hx     Cardiovascular  Rhythm: regular, Rate: normal,     Pulmonary  Breath sounds clear to auscultation,     Other Findings        Anesthesia Plan  ASA Score- 2     Anesthesia Type- IV sedation with anesthesia with ASA Monitors. Additional Monitors:   Airway Plan:     Comment: I discussed the risks and benefits of IV sedation anesthesia including the possibility of the need to convert to general anesthesia and the potential risk of awareness. The patient was given the opportunity to ask questions, which were answered. .       Plan Factors-Exercise tolerance (METS): >4 METS. Chart reviewed. Patient is not a current smoker. Induction- intravenous. Postoperative Plan-     Informed Consent- Anesthetic plan and risks discussed with patient. I personally reviewed this patient with the CRNA. Discussed and agreed on the Anesthesia Plan with the CRNA. Bhanu Villafuerte

## 2023-07-18 ENCOUNTER — TELEPHONE (OUTPATIENT)
Dept: GASTROENTEROLOGY | Facility: CLINIC | Age: 43
End: 2023-07-18

## 2023-07-18 PROCEDURE — 88342 IMHCHEM/IMCYTCHM 1ST ANTB: CPT | Performed by: PATHOLOGY

## 2023-07-18 PROCEDURE — 88305 TISSUE EXAM BY PATHOLOGIST: CPT | Performed by: PATHOLOGY

## 2023-07-18 NOTE — TELEPHONE ENCOUNTER
----- Message from Robinson Kendall MD sent at 7/18/2023  4:16 PM EDT -----  Left [facility] about benign biopsies. Repeat colon in 10 years.

## 2023-07-21 ENCOUNTER — HOSPITAL ENCOUNTER (OUTPATIENT)
Dept: MAMMOGRAPHY | Facility: CLINIC | Age: 43
End: 2023-07-21
Payer: COMMERCIAL

## 2023-07-21 VITALS — BODY MASS INDEX: 33.97 KG/M2 | HEIGHT: 64 IN | WEIGHT: 199 LBS

## 2023-07-21 DIAGNOSIS — Z12.31 ENCOUNTER FOR SCREENING MAMMOGRAM FOR MALIGNANT NEOPLASM OF BREAST: ICD-10-CM

## 2023-07-21 PROCEDURE — 77067 SCR MAMMO BI INCL CAD: CPT

## 2023-07-21 PROCEDURE — 77063 BREAST TOMOSYNTHESIS BI: CPT

## 2024-01-01 ENCOUNTER — OFFICE VISIT (OUTPATIENT)
Dept: URGENT CARE | Facility: CLINIC | Age: 44
End: 2024-01-01
Payer: COMMERCIAL

## 2024-01-01 VITALS
DIASTOLIC BLOOD PRESSURE: 83 MMHG | BODY MASS INDEX: 36.37 KG/M2 | TEMPERATURE: 97.5 F | HEART RATE: 78 BPM | SYSTOLIC BLOOD PRESSURE: 132 MMHG | HEIGHT: 64 IN | OXYGEN SATURATION: 96 % | WEIGHT: 213 LBS

## 2024-01-01 DIAGNOSIS — J20.9 ACUTE BRONCHITIS, UNSPECIFIED ORGANISM: Primary | ICD-10-CM

## 2024-01-01 DIAGNOSIS — R06.2 WHEEZING: ICD-10-CM

## 2024-01-01 PROCEDURE — 99213 OFFICE O/P EST LOW 20 MIN: CPT

## 2024-01-01 RX ORDER — AZITHROMYCIN 250 MG/1
TABLET, FILM COATED ORAL
Qty: 6 TABLET | Refills: 0 | Status: SHIPPED | OUTPATIENT
Start: 2024-01-01 | End: 2024-01-05

## 2024-01-01 RX ORDER — PREDNISONE 20 MG/1
40 TABLET ORAL DAILY
Qty: 10 TABLET | Refills: 0 | Status: SHIPPED | OUTPATIENT
Start: 2024-01-01 | End: 2024-01-06

## 2024-01-01 RX ORDER — ALBUTEROL SULFATE 90 UG/1
2 AEROSOL, METERED RESPIRATORY (INHALATION) EVERY 6 HOURS PRN
Qty: 8.5 G | Refills: 0 | Status: SHIPPED | OUTPATIENT
Start: 2024-01-01

## 2024-01-01 RX ORDER — BENZONATATE 200 MG/1
200 CAPSULE ORAL 3 TIMES DAILY PRN
Qty: 20 CAPSULE | Refills: 0 | Status: SHIPPED | OUTPATIENT
Start: 2024-01-01

## 2024-01-01 NOTE — PATIENT INSTRUCTIONS
Hydration and rest.  Start azithromycin.  Start albuterol.  Prednisone as directed.  Benzonatate as needed for cough.   Tylenol and Motrin over-the-counter for pain and fever.    Follow up with PCP.  Go to the nearest emergency department if you have difficulty breathing, worsening symptoms.         Acute Bronchitis   WHAT YOU NEED TO KNOW:   Acute bronchitis is swelling and irritation in your lungs. It is usually caused by a virus and most often happens in the winter. Bronchitis may also be caused by bacteria or by a chemical irritant, such as smoke.  DISCHARGE INSTRUCTIONS:   Return to the emergency department if:   You cough up blood.     Your lips or fingernails turn blue.     You feel like you are not getting enough air when you breathe.     Call your doctor if:   Your symptoms do not go away or get worse, even after treatment.     Your cough does not get better within 4 weeks.     You have questions or concerns about your condition or care.     Medicines:  You may  need any of the following:  Cough suppressants  decrease your urge to cough.      Decongestants  help loosen mucus in your lungs and make it easier to cough up. This can help you breathe easier.     Inhalers  may be given. Your healthcare provider may give you one or more inhalers to help you breathe easier and cough less. An inhaler gives your medicine to open your airways. Ask your healthcare provider to show you how to use your inhaler correctly.          Antibiotics  may be given for up to 5 days if your bronchitis is caused by bacteria.     Acetaminophen  decreases pain and fever. It is available without a doctor's order. Ask how much to take and how often to take it. Follow directions. Read the labels of all other medicines you are using to see if they also contain acetaminophen, or ask your doctor or pharmacist. Acetaminophen can cause liver damage if not taken correctly. Do not use more than 4 grams (4,000 milligrams) total of acetaminophen  in one day.      NSAIDs  help decrease swelling and pain or fever. This medicine is available with or without a doctor's order. NSAIDs can cause stomach bleeding or kidney problems in certain people. If you take blood thinner medicine, always ask your healthcare provider if NSAIDs are safe for you. Always read the medicine label and follow directions.     Take your medicine as directed.  Contact your healthcare provider if you think your medicine is not helping or if you have side effects. Tell him of her if you are allergic to any medicine. Keep a list of the medicines, vitamins, and herbs you take. Include the amounts, and when and why you take them. Bring the list or the pill bottles to follow-up visits. Carry your medicine list with you in case of an emergency.     Self-care:   Drink liquids as directed.  You may need to drink more liquids than usual to stay hydrated. Ask how much liquid to drink each day and which liquids are best for you.     Use a cool mist humidifier  to increase air moisture in your home. This may make it easier for you to breathe and help decrease your cough.      Get more rest.  Rest helps your body to heal. Slowly start to do more each day. Rest when you feel it is needed.     Avoid irritants in the air.  Avoid chemicals, fumes, and dust. Wear a face mask if you must work around dust or fumes. Stay inside on days when air pollution levels are high. If you have allergies, stay inside when pollen counts are high. Do not use aerosol products, such as spray-on deodorant, bug spray, and hair spray.     Do not smoke or be around others who are smoking.  Nicotine and other chemicals in cigarettes and cigars can cause lung damage. Ask your healthcare provider for information if you currently smoke and need help to quit. E-cigarettes or smokeless tobacco still contain nicotine. Talk to your healthcare provider before you use these products.     Prevent acute bronchitis:       Ask about vaccines  you may need.  Get a flu vaccine each year as soon as recommended, usually in September or October. Ask your healthcare provider if you should also get a pneumonia or COVID-19 vaccine. Your healthcare provider can tell you if you should also get other vaccines, and when to get them.     Prevent the spread of germs.  You can decrease your risk for acute bronchitis and other illnesses by doing the following:      Wash your hands often with soap and water. Carry germ-killing hand lotion or gel with you. You can use the lotion or gel to clean your hands when soap and water are not available.          Do not touch your eyes, nose, or mouth unless you have washed your hands first.     Always cover your mouth when you cough to prevent the spread of germs. It is best to cough into a tissue or your shirt sleeve instead of into your hand. Ask those around you to cover their mouths when they cough.     Try to avoid people who have a cold or the flu. If you are sick, stay away from others as much as possible.     Follow up with your doctor as directed:  Write down questions you have so you will remember to ask them during your follow-up visits.  © Copyright [employer] 2021 Information is for End User's use only and may not be sold, redistributed or otherwise used for commercial purposes. All illustrations and images included in CareNotes® are the copyrighted property of [employer] or [employer]  The above information is an  only. It is not intended as medical advice for individual conditions or treatments. Talk to your doctor, nurse or pharmacist before following any medical regimen to see if it is safe and effective for you.

## 2024-01-01 NOTE — PROGRESS NOTES
[facility]        NAME: Yesenia Lemons is a 43 y.o. female  : 1980    MRN: [id number]  DATE: 2024  TIME: 11:44 AM    Assessment and Plan   Acute bronchitis, unspecified organism [J20.9]  1. Acute bronchitis, unspecified organism  azithromycin (ZITHROMAX) 250 mg tablet    benzonatate (TESSALON) 200 MG capsule      2. Wheezing  albuterol (ProAir HFA) 90 mcg/act inhaler    predniSONE 20 mg tablet            Patient Instructions     Hydration and rest.  Start azithromycin.  Start albuterol.  Prednisone as directed.  Benzonatate as needed for cough.   Tylenol and Motrin over-the-counter for pain and fever.    Follow up with PCP.  Go to the nearest emergency department if you have difficulty breathing, worsening symptoms.     Chief Complaint     Chief Complaint   Patient presents with    Cough     1.5 week now, and ribs are starting to hurt when coughing, has a wheezing sound on exhale. Has tried multiple OTC medicines, but has not been able to shake it off. Wheezing crackles when laying down.          History of Present Illness       The patient presents today with complaints of chest congestion, cough, wheezing, SOB, pain in chest when coughing x 1.5 weeks. She denies fever/chills, history of asthma/COPD. She has tried OTC cough/cold medications with minimal relief.         Review of Systems   Review of Systems   Constitutional:  Negative for chills and fever.   HENT:  Negative for congestion.    Respiratory:  Positive for cough, shortness of breath and wheezing.    Gastrointestinal:  Negative for abdominal pain, diarrhea, nausea and vomiting.   Musculoskeletal:  Positive for myalgias (pain in chest/ribs when coughing).   Skin:  Negative for rash.         Current Medications       Current Outpatient Medications:     albuterol (ProAir HFA) 90 mcg/act inhaler, Inhale 2 puffs every 6 (six) hours as needed for wheezing, Disp: 8.5 g, Rfl: 0    azithromycin (ZITHROMAX) 250 mg tablet, Take 2  tablets today then 1 tablet daily x 4 days, Disp: 6 tablet, Rfl: 0    benzonatate (TESSALON) 200 MG capsule, Take 1 capsule (200 mg total) by mouth 3 (three) times a day as needed for cough, Disp: 20 capsule, Rfl: 0    predniSONE 20 mg tablet, Take 2 tablets (40 mg total) by mouth daily for 5 days, Disp: 10 tablet, Rfl: 0    colestipol (COLESTID) 1 g tablet, TAKE 1 TABLET BY MOUTH TWICE A DAY, Disp: 180 tablet, Rfl: 0    [employer] FIBER GUMMIES PO, Take by mouth, Disp: , Rfl:     esomeprazole (NexIUM) 40 MG capsule, TAKE 1 CAPSULE BY MOUTH 2 TIMES A DAY BEFORE MEALS., Disp: 60 capsule, Rfl: 0    levonorgestrel-ethinyl estradiol (Sronyx) 0.1-20 MG-MCG per tablet, Take 1 tablet by mouth daily, Disp: 84 tablet, Rfl: 3    omeprazole (PriLOSEC) 40 MG capsule, TAKE 1 CAPSULE (40 MG TOTAL) BY MOUTH DAILY., Disp: 90 capsule, Rfl: 2    Probiotic Product (PROBIOTIC-10 PO), Take by mouth, Disp: , Rfl:     sertraline (ZOLOFT) 50 mg tablet, Take 50 mg by mouth daily, Disp: , Rfl:     Synthroid 175 MCG tablet, TAKE 1 TABLET EVERY MORNING ...PER MD MUST GET BLOODWORK DONE, Disp: , Rfl:     Vitamin D, Cholecalciferol, 25 MCG (1000 UT) CAPS, Take 1 capsule by mouth, Disp: , Rfl:     Current Allergies     Allergies as of 01/01/2024 - Reviewed 01/01/2024   Allergen Reaction Noted    Buspirone Other (See Comments) 09/27/2021    Cephalosporins Hives 09/25/2017    Penicillins Hives 09/25/2017    Sulfa antibiotics Hives 09/25/2017            The following portions of the patient's history were reviewed and updated as appropriate: allergies, current medications, past family history, past medical history, past social history, past surgical history and problem list.     Past Medical History:   Diagnosis Date    Abnormal Pap smear of cervix     Early 20's     Depression     Disease of thyroid gland     GERD (gastroesophageal reflux disease)     Uterine anomaly     history of uterine septum    WPW (Holley-Parkinson-White syndrome)        Past  "Surgical History:   Procedure Laterality Date    ABLATION RADIO FREQUENCY ATRIAL (MAZE/CRYOABLATION)      ANKLE SURGERY      CERVICAL CONE BIOPSY      CHOLECYSTECTOMY      KNEE SURGERY      acl    HI CONIZATION CERVIX W/WO D&C RPR KNIFE/LASER      THYROIDECTOMY         Family History   Problem Relation Age of Onset    Stomach cancer Mother     Sarcoidosis Father     Diabetes Sister     No Known Problems Maternal Grandmother     Breast cancer Paternal Grandmother 70    No Known Problems Paternal Aunt          Medications have been verified.        Objective   /83   Pulse 78   Temp 97.5 °F (36.4 °C)   Ht 5' 4\" (1.626 m)   Wt 96.6 kg (213 lb)   SpO2 96%   BMI 36.56 kg/m²        Physical Exam     Physical Exam  Vitals and nursing note reviewed.   Constitutional:       General: She is not in acute distress.     Appearance: Normal appearance. She is not ill-appearing.   HENT:      Head: Normocephalic and atraumatic.      Right Ear: Tympanic membrane, ear canal and external ear normal.      Left Ear: Tympanic membrane, ear canal and external ear normal.      Nose: No congestion or rhinorrhea.      Mouth/Throat:      Lips: Pink.      Mouth: Mucous membranes are moist.   Eyes:      General: Vision grossly intact.      Extraocular Movements: Extraocular movements intact.      Pupils: Pupils are equal, round, and reactive to light.   Cardiovascular:      Rate and Rhythm: Normal rate and regular rhythm.      Heart sounds: Normal heart sounds. No murmur heard.  Pulmonary:      Effort: Pulmonary effort is normal. No respiratory distress.      Breath sounds: No decreased air movement. Wheezing (expiratory throughout) present. No decreased breath sounds, rhonchi or rales.   Musculoskeletal:         General: Normal range of motion.      Cervical back: Normal range of motion.   Skin:     General: Skin is warm.      Findings: No rash.   Neurological:      Mental Status: She is alert and oriented to person, place, and time. "      Motor: Motor function is intact.      Gait: Gait is intact.   Psychiatric:         Attention and Perception: Attention normal.         Mood and Affect: Mood normal.

## 2024-05-16 ENCOUNTER — ANNUAL EXAM (OUTPATIENT)
Dept: OBGYN CLINIC | Facility: CLINIC | Age: 44
End: 2024-05-16
Payer: COMMERCIAL

## 2024-05-16 VITALS
HEIGHT: 64 IN | SYSTOLIC BLOOD PRESSURE: 120 MMHG | BODY MASS INDEX: 35.68 KG/M2 | DIASTOLIC BLOOD PRESSURE: 70 MMHG | WEIGHT: 209 LBS

## 2024-05-16 DIAGNOSIS — N63.11 BREAST LUMP ON RIGHT SIDE AT 11 O'CLOCK POSITION: ICD-10-CM

## 2024-05-16 DIAGNOSIS — Z12.31 ENCOUNTER FOR SCREENING MAMMOGRAM FOR MALIGNANT NEOPLASM OF BREAST: ICD-10-CM

## 2024-05-16 DIAGNOSIS — N89.8 VAGINAL DISCHARGE: ICD-10-CM

## 2024-05-16 DIAGNOSIS — N39.3 URINARY, INCONTINENCE, STRESS FEMALE: ICD-10-CM

## 2024-05-16 DIAGNOSIS — Z01.419 WOMEN'S ANNUAL ROUTINE GYNECOLOGICAL EXAMINATION: Primary | ICD-10-CM

## 2024-05-16 PROCEDURE — S0612 ANNUAL GYNECOLOGICAL EXAMINA: HCPCS | Performed by: PHYSICIAN ASSISTANT

## 2024-05-16 NOTE — PROGRESS NOTES
ASSESSMENT & PLAN:   Diagnoses and all orders for this visit:    Women's annual routine gynecological examination    Encounter for screening mammogram for malignant neoplasm of breast  -     Mammo screening bilateral w 3d & cad; Future    Breast lump on right side at 11 o'clock position  -     Mammo diagnostic right w 3d & cad; Future  -     US breast right limited (diagnostic); Future    Urinary, incontinence, stress female  -     Ambulatory Referral to Physical Therapy; Future    Vaginal discharge  Comments:  possible fecal matter from vagina per patient  Orders:  -     CT abdomen pelvis w wo contrast; Future          The following were reviewed in today's visit: ASCCP guidelines, Gardisil vaccination, STD testing breast self exam, mammography screening ordered, STD testing, exercise, and healthy diet.    Patient to return to office in yearly for annual exam.     All questions have been answered to her satisfaction.        CC:  Annual Gynecologic Examination  Chief Complaint   Patient presents with    Gynecologic Exam     Pt is here for an annual exam; pap is current.  Last pap 23 neg pap/neg hpv   mammo 23       HPI: Yesenia Lemons is a 43 y.o.  who presents for annual gynecologic examination.  She has the following concerns:    Urine leakage with laugh sneeze and cough - interested in PT  Concerned she may be having fecal matter come out her vagina. No odor or infections or fevers. States when she wipes she feels things are too close together  Breast lump and tenderness on the right.       Health Maintenance:    Exercise: intermittently  Breast exams/breast awareness: yes  Last mammogram:     Past Medical History:   Diagnosis Date    Abnormal Pap smear of cervix     Early 20's     Depression     Disease of thyroid gland     GERD (gastroesophageal reflux disease)     Uterine anomaly     history of uterine septum    WPW (Holley-Parkinson-White syndrome)        Past Surgical History:   Procedure  Laterality Date    ABLATION RADIO FREQUENCY ATRIAL (MAZE/CRYOABLATION)      ANKLE SURGERY      CERVICAL CONE BIOPSY      CHOLECYSTECTOMY      KNEE SURGERY      acl    RI CONIZATION CERVIX W/WO D&C RPR KNIFE/LASER      THYROIDECTOMY         Past OB/Gyn History:   Patient's last menstrual period was 04/24/2024.    Last Pap: 2023 : no abnormalities  History of abnormal Pap smear: Yes   HPV vaccine completed:  received 2 of 3 shots. Recommend # 3 today    Patient is currently sexually active.   STD testing: no  Current contraception: vasectomy      Family History  Family History   Problem Relation Age of Onset    Stomach cancer Mother     Sarcoidosis Father     Diabetes Sister     No Known Problems Maternal Grandmother     Breast cancer Paternal Grandmother 70    No Known Problems Paternal Aunt        Family history of uterine or ovarian cancer: no  Family history of breast cancer: yes  Family history of colon cancer: no    Social History:  Social History     Socioeconomic History    Marital status: /Civil Union     Spouse name: Not on file    Number of children: Not on file    Years of education: Not on file    Highest education level: Not on file   Occupational History    Not on file   Tobacco Use    Smoking status: Never    Smokeless tobacco: Never   Vaping Use    Vaping status: Never Used   Substance and Sexual Activity    Alcohol use: Yes     Comment: less than weekly    Drug use: No    Sexual activity: Yes     Partners: Male     Birth control/protection: Male Sterilization     Comment: Sronyx   Other Topics Concern    Not on file   Social History Narrative    Not on file     Social Determinants of Health     Financial Resource Strain: Low Risk  (3/5/2024)    Received [facility], [facility]    Overall Financial Resource Strain (CARDIA)     Difficulty of Paying Living Expenses: Not very hard   Food Insecurity: No Food Insecurity (3/5/2024)    Received from [facility], [facility]    Hunger Vital Sign     Worried About Running Out of Food in the Last Year: Never true     Ran Out of Food in the Last Year: Never true   Transportation Needs: No Transportation Needs (3/5/2024)    Received from [facility], [facility]    PRAPARE - Transportation     Lack of Transportation (Medical): No     Lack of Transportation (Non-Medical): No   Physical Activity: Not on file   Stress: Stress Concern Present (3/5/2024)    Received from [facility], [facility]    [employer] [place] - Occupational Stress Questionnaire     Feeling of Stress : To some extent   Social Connections: Feeling Socially Integrated (3/5/2024)    Received from [facility], [facility]    OASIS : Social Isolation     How often do you feel lonely or isolated from those around you?: Never   Intimate Partner Violence: Not At Risk (3/5/2024)    Received from [facility], [facility]    Humiliation, Afraid, Rape, and Kick questionnaire     Fear of Current or Ex-Partner: No     Emotionally Abused: No     Physically Abused: No     Sexually Abused: No   Housing Stability: Low Risk  (3/5/2024)    Received from [facility], [facility]    Housing Stability Vital Sign     Unable to Pay for Housing in the Last Year: No     Number of Places Lived in the Last Year: 1     Unstable Housing in the Last Year: No     Domestic violence screen: negative    Allergies:  Allergies   Allergen Reactions    Buspirone Other (See Comments)     MAKES HER JITTERY    Cephalosporins Hives    Penicillins Hives    Sulfa Antibiotics Hives       Medications:    Current Outpatient Medications:     omeprazole (PriLOSEC) 40 MG capsule, TAKE 1 CAPSULE (40 MG TOTAL) BY MOUTH DAILY., Disp: 90 capsule, Rfl: 2    Probiotic Product  "(PROBIOTIC-10 PO), Take by mouth, Disp: , Rfl:     sertraline (ZOLOFT) 50 mg tablet, Take 50 mg by mouth daily, Disp: , Rfl:     Synthroid 175 MCG tablet, TAKE 1 TABLET EVERY MORNING ...PER MD MUST GET BLOODWORK DONE, Disp: , Rfl:     Vitamin D, Cholecalciferol, 25 MCG (1000 UT) CAPS, Take 1 capsule by mouth, Disp: , Rfl:     Review of Systems:  Review of Systems   Constitutional:  Negative for chills, fever and unexpected weight change.   Respiratory:  Negative for shortness of breath.    Cardiovascular:  Negative for chest pain.   Gastrointestinal:  Negative for abdominal pain, diarrhea, nausea and vomiting.   Skin:  Negative for rash.   Psychiatric/Behavioral:  Negative for dysphoric mood. The patient is not nervous/anxious.          Physical Exam:  /70   Ht 5' 4\" (1.626 m)   Wt 94.8 kg (209 lb)   LMP 04/24/2024   BMI 35.87 kg/m²    Physical Exam  Constitutional:       Appearance: Normal appearance.   Genitourinary:      Vulva and urethral meatus normal.      No lesions in the vagina.      Right Labia: No rash or lesions.     Left Labia: No lesions or rash.     No vaginal discharge, erythema or bleeding.      Vaginal exam comments: No feces noted in vaginal vault.        Right Adnexa: not tender and no mass present.     Left Adnexa: not tender and no mass present.     No cervical discharge or lesion.      Uterus is not tender.   Breasts:     Breasts are symmetrical.      Right: No mass or skin change.      Left: No mass or skin change.   HENT:      Head: Normocephalic and atraumatic.   Cardiovascular:      Rate and Rhythm: Normal rate and regular rhythm.      Heart sounds: Normal heart sounds. No murmur heard.     No friction rub. No gallop.   Pulmonary:      Effort: Pulmonary effort is normal.      Breath sounds: Normal breath sounds. No wheezing, rhonchi or rales.   Abdominal:      General: Abdomen is flat. There is no distension.      Palpations: Abdomen is soft.      Tenderness: There is no " abdominal tenderness.   Musculoskeletal:      Cervical back: Neck supple.   Lymphadenopathy:      Upper Body:      Right upper body: No axillary adenopathy.      Left upper body: No axillary adenopathy.   Neurological:      General: No focal deficit present.      Mental Status: She is alert.   Skin:     General: Skin is warm and dry.   Psychiatric:         Mood and Affect: Mood normal.         Behavior: Behavior normal.   Vitals reviewed.

## 2024-05-28 ENCOUNTER — HOSPITAL ENCOUNTER (OUTPATIENT)
Dept: CT IMAGING | Facility: HOSPITAL | Age: 44
Discharge: HOME | End: 2024-05-28
Payer: COMMERCIAL

## 2024-05-28 DIAGNOSIS — N89.8 VAGINAL DISCHARGE: ICD-10-CM

## 2024-05-28 PROCEDURE — 74178 CT ABD&PLV WO CNTR FLWD CNTR: CPT

## 2024-05-28 RX ADMIN — IOHEXOL 100 ML: 350 INJECTION, SOLUTION INTRAVENOUS at 09:13

## 2024-05-28 RX ADMIN — IOHEXOL 50 ML: 240 INJECTION, SOLUTION INTRATHECAL; INTRAVASCULAR; INTRAVENOUS; ORAL at 09:13

## 2024-05-29 ENCOUNTER — PATIENT MESSAGE (OUTPATIENT)
Dept: OBGYN CLINIC | Facility: CLINIC | Age: 44
End: 2024-05-29

## 2024-05-29 DIAGNOSIS — N83.201 RIGHT OVARIAN CYST: Primary | ICD-10-CM

## 2024-07-03 ENCOUNTER — HOSPITAL ENCOUNTER (OUTPATIENT)
Dept: MAMMOGRAPHY | Facility: CLINIC | Age: 44
Discharge: HOME | End: 2024-07-03
Payer: COMMERCIAL

## 2024-07-03 ENCOUNTER — HOSPITAL ENCOUNTER (OUTPATIENT)
Dept: ULTRASOUND IMAGING | Facility: CLINIC | Age: 44
Discharge: HOME | End: 2024-07-03
Payer: COMMERCIAL

## 2024-07-03 VITALS — BODY MASS INDEX: 35.68 KG/M2 | WEIGHT: 209 LBS | HEIGHT: 64 IN

## 2024-07-03 DIAGNOSIS — N63.11 BREAST LUMP ON RIGHT SIDE AT 11 O'CLOCK POSITION: ICD-10-CM

## 2024-07-03 PROCEDURE — 76642 ULTRASOUND BREAST LIMITED: CPT

## 2024-07-03 PROCEDURE — G0279 TOMOSYNTHESIS, MAMMO: HCPCS

## 2024-07-03 PROCEDURE — 77066 DX MAMMO INCL CAD BI: CPT

## 2024-08-10 ENCOUNTER — APPOINTMENT (EMERGENCY)
Dept: CT IMAGING | Facility: HOSPITAL | Age: 44
End: 2024-08-10
Payer: COMMERCIAL

## 2024-08-10 ENCOUNTER — HOSPITAL ENCOUNTER (EMERGENCY)
Facility: HOSPITAL | Age: 44
Discharge: HOME | End: 2024-08-11
Attending: INTERNAL MEDICINE
Payer: COMMERCIAL

## 2024-08-10 VITALS
RESPIRATION RATE: 18 BRPM | SYSTOLIC BLOOD PRESSURE: 141 MMHG | TEMPERATURE: 97.9 F | BODY MASS INDEX: 34.15 KG/M2 | WEIGHT: 200 LBS | HEART RATE: 87 BPM | HEIGHT: 64 IN | OXYGEN SATURATION: 97 % | DIASTOLIC BLOOD PRESSURE: 63 MMHG

## 2024-08-10 DIAGNOSIS — K52.9 ENTEROCOLITIS: Primary | ICD-10-CM

## 2024-08-10 LAB
ALBUMIN SERPL BCG-MCNC: 4.7 G/DL (ref 3.5–5)
ALP SERPL-CCNC: 63 U/L (ref 34–104)
ALT SERPL W P-5'-P-CCNC: 32 U/L (ref 7–52)
ANION GAP SERPL CALCULATED.3IONS-SCNC: 11 MMOL/L (ref 4–13)
AST SERPL W P-5'-P-CCNC: 27 U/L (ref 13–39)
ATRIAL RATE: 99 BPM
B-HCG SERPL-ACNC: <0.6 MIU/ML (ref 0–5)
BACTERIA UR QL AUTO: ABNORMAL /HPF
BASOPHILS # BLD AUTO: 0.02 THOUSANDS/ÂΜL (ref 0–0.1)
BASOPHILS NFR BLD AUTO: 0 % (ref 0–1)
BILIRUB SERPL-MCNC: 0.74 MG/DL (ref 0.2–1)
BILIRUB UR QL STRIP: NEGATIVE
BUN SERPL-MCNC: 12 MG/DL (ref 5–25)
CALCIUM SERPL-MCNC: 10 MG/DL (ref 8.4–10.2)
CHLORIDE SERPL-SCNC: 103 MMOL/L (ref 96–108)
CLARITY UR: ABNORMAL
CO2 SERPL-SCNC: 24 MMOL/L (ref 21–32)
COLOR UR: YELLOW
CREAT SERPL-MCNC: 0.92 MG/DL (ref 0.6–1.3)
EOSINOPHIL # BLD AUTO: 0.19 THOUSAND/ÂΜL (ref 0–0.61)
EOSINOPHIL NFR BLD AUTO: 2 % (ref 0–6)
ERYTHROCYTE [DISTWIDTH] IN BLOOD BY AUTOMATED COUNT: 12.5 % (ref 11.6–15.1)
GFR SERPL CREATININE-BSD FRML MDRD: 76 ML/MIN/1.73SQ M
GLUCOSE SERPL-MCNC: 100 MG/DL (ref 65–140)
GLUCOSE UR STRIP-MCNC: NEGATIVE MG/DL
HCT VFR BLD AUTO: 45.7 % (ref 34.8–46.1)
HGB BLD-MCNC: 15.2 G/DL (ref 11.5–15.4)
HGB UR QL STRIP.AUTO: ABNORMAL
IMM GRANULOCYTES # BLD AUTO: 0.04 THOUSAND/UL (ref 0–0.2)
IMM GRANULOCYTES NFR BLD AUTO: 1 % (ref 0–2)
KETONES UR STRIP-MCNC: NEGATIVE MG/DL
LEUKOCYTE ESTERASE UR QL STRIP: NEGATIVE
LIPASE SERPL-CCNC: 23 U/L (ref 11–82)
LYMPHOCYTES # BLD AUTO: 1.74 THOUSANDS/ÂΜL (ref 0.6–4.47)
LYMPHOCYTES NFR BLD AUTO: 20 % (ref 14–44)
MCH RBC QN AUTO: 28.5 PG (ref 26.8–34.3)
MCHC RBC AUTO-ENTMCNC: 33.3 G/DL (ref 31.4–37.4)
MCV RBC AUTO: 86 FL (ref 82–98)
MONOCYTES # BLD AUTO: 0.85 THOUSAND/ÂΜL (ref 0.17–1.22)
MONOCYTES NFR BLD AUTO: 10 % (ref 4–12)
MUCOUS THREADS UR QL AUTO: ABNORMAL
NEUTROPHILS # BLD AUTO: 5.67 THOUSANDS/ÂΜL (ref 1.85–7.62)
NEUTS SEG NFR BLD AUTO: 67 % (ref 43–75)
NITRITE UR QL STRIP: NEGATIVE
NON-SQ EPI CELLS URNS QL MICRO: ABNORMAL /HPF
NRBC BLD AUTO-RTO: 0 /100 WBCS
P AXIS: 8 DEGREES
PH UR STRIP.AUTO: 6 [PH]
PLATELET # BLD AUTO: 341 THOUSANDS/UL (ref 149–390)
PMV BLD AUTO: 10 FL (ref 8.9–12.7)
POTASSIUM SERPL-SCNC: 3.1 MMOL/L (ref 3.5–5.3)
PR INTERVAL: 126 MS
PROT SERPL-MCNC: 8.3 G/DL (ref 6.4–8.4)
PROT UR STRIP-MCNC: NEGATIVE MG/DL
QRS AXIS: 63 DEGREES
QRSD INTERVAL: 76 MS
QT INTERVAL: 346 MS
QTC INTERVAL: 444 MS
RBC # BLD AUTO: 5.34 MILLION/UL (ref 3.81–5.12)
RBC #/AREA URNS AUTO: ABNORMAL /HPF
SODIUM SERPL-SCNC: 138 MMOL/L (ref 135–147)
SP GR UR STRIP.AUTO: 1.02
T WAVE AXIS: 42 DEGREES
UROBILINOGEN UR QL STRIP.AUTO: 0.2 E.U./DL
VENTRICULAR RATE: 99 BPM
WBC # BLD AUTO: 8.51 THOUSAND/UL (ref 4.31–10.16)
WBC #/AREA URNS AUTO: ABNORMAL /HPF

## 2024-08-10 PROCEDURE — NC001 PR NO CHARGE: Performed by: INTERNAL MEDICINE

## 2024-08-10 PROCEDURE — 36415 COLL VENOUS BLD VENIPUNCTURE: CPT

## 2024-08-10 PROCEDURE — 85025 COMPLETE CBC W/AUTO DIFF WBC: CPT | Performed by: INTERNAL MEDICINE

## 2024-08-10 PROCEDURE — 96376 TX/PRO/DX INJ SAME DRUG ADON: CPT

## 2024-08-10 PROCEDURE — 83690 ASSAY OF LIPASE: CPT | Performed by: INTERNAL MEDICINE

## 2024-08-10 PROCEDURE — 84702 CHORIONIC GONADOTROPIN TEST: CPT | Performed by: INTERNAL MEDICINE

## 2024-08-10 PROCEDURE — 81001 URINALYSIS AUTO W/SCOPE: CPT | Performed by: INTERNAL MEDICINE

## 2024-08-10 PROCEDURE — 74177 CT ABD & PELVIS W/CONTRAST: CPT

## 2024-08-10 PROCEDURE — 96375 TX/PRO/DX INJ NEW DRUG ADDON: CPT

## 2024-08-10 PROCEDURE — 96374 THER/PROPH/DIAG INJ IV PUSH: CPT

## 2024-08-10 PROCEDURE — 96361 HYDRATE IV INFUSION ADD-ON: CPT

## 2024-08-10 PROCEDURE — 81003 URINALYSIS AUTO W/O SCOPE: CPT | Performed by: INTERNAL MEDICINE

## 2024-08-10 PROCEDURE — 80053 COMPREHEN METABOLIC PANEL: CPT | Performed by: INTERNAL MEDICINE

## 2024-08-10 PROCEDURE — 99284 EMERGENCY DEPT VISIT MOD MDM: CPT

## 2024-08-10 PROCEDURE — 93005 ELECTROCARDIOGRAM TRACING: CPT

## 2024-08-10 RX ORDER — ONDANSETRON 2 MG/ML
4 INJECTION INTRAMUSCULAR; INTRAVENOUS ONCE
Status: COMPLETED | OUTPATIENT
Start: 2024-08-10 | End: 2024-08-10

## 2024-08-10 RX ORDER — PANTOPRAZOLE SODIUM 40 MG/10ML
40 INJECTION, POWDER, LYOPHILIZED, FOR SOLUTION INTRAVENOUS ONCE
Status: COMPLETED | OUTPATIENT
Start: 2024-08-10 | End: 2024-08-10

## 2024-08-10 RX ADMIN — ONDANSETRON 4 MG: 2 INJECTION INTRAMUSCULAR; INTRAVENOUS at 21:17

## 2024-08-10 RX ADMIN — IOHEXOL 100 ML: 350 INJECTION, SOLUTION INTRAVENOUS at 22:57

## 2024-08-10 RX ADMIN — ONDANSETRON 4 MG: 2 INJECTION INTRAMUSCULAR; INTRAVENOUS at 19:20

## 2024-08-10 RX ADMIN — PANTOPRAZOLE SODIUM 40 MG: 40 INJECTION, POWDER, FOR SOLUTION INTRAVENOUS at 20:17

## 2024-08-10 RX ADMIN — SODIUM CHLORIDE 1000 ML: 0.9 INJECTION, SOLUTION INTRAVENOUS at 19:22

## 2024-08-11 PROCEDURE — 96376 TX/PRO/DX INJ SAME DRUG ADON: CPT

## 2024-08-11 RX ORDER — DICYCLOMINE HCL 20 MG
20 TABLET ORAL 2 TIMES DAILY
Qty: 20 TABLET | Refills: 0 | Status: SHIPPED | OUTPATIENT
Start: 2024-08-11

## 2024-08-11 RX ORDER — ONDANSETRON 4 MG/1
4 TABLET, FILM COATED ORAL EVERY 6 HOURS
Qty: 12 TABLET | Refills: 0 | Status: SHIPPED | OUTPATIENT
Start: 2024-08-11

## 2024-08-11 RX ORDER — DICYCLOMINE HCL 20 MG
20 TABLET ORAL ONCE
Status: COMPLETED | OUTPATIENT
Start: 2024-08-11 | End: 2024-08-11

## 2024-08-11 RX ORDER — ONDANSETRON 2 MG/ML
4 INJECTION INTRAMUSCULAR; INTRAVENOUS ONCE
Status: COMPLETED | OUTPATIENT
Start: 2024-08-11 | End: 2024-08-11

## 2024-08-11 RX ADMIN — ONDANSETRON 4 MG: 2 INJECTION INTRAMUSCULAR; INTRAVENOUS at 00:25

## 2024-08-11 RX ADMIN — DICYCLOMINE HYDROCHLORIDE 20 MG: 20 TABLET ORAL at 00:19

## 2024-08-11 NOTE — ED PROVIDER NOTES
History  Chief Complaint   Patient presents with    Abdominal Pain     Upper mid abdominal pain to right upper quadrant pain started Thursday; N/v/D; hard to keep anything down; states she feels like her heart is fluttering since friday     43-year-old female presents with chief complaint of abdominal pain nausea vomiting and diarrhea x 48 to 72 hours.  Patient states she vomited twice today, 3 times yesterday and on Thursday and Friday patient reports watery diarrhea for both days which seems to be slowing down at this time.  She has abdominal pain diffuse but more intense in the epigastric and right upper quadrant, patient is status postcholecystectomy in 2006.  Is not believe she has had a fever, she has no flank pain or dysuria.  Patient states she has had no recent travel, nobody in the family has had similar symptoms.  She has had no chest pain chest pressure shortness of breath.        Prior to Admission Medications   Prescriptions Last Dose Informant Patient Reported? Taking?   Probiotic Product (PROBIOTIC-10 PO)  Self Yes No   Sig: Take by mouth   Synthroid 175 MCG tablet  Self Yes No   Sig: TAKE 1 TABLET EVERY MORNING ...PER MD MUST GET BLOODWORK DONE   Vitamin D, Cholecalciferol, 25 MCG (1000 UT) CAPS   Yes No   Sig: Take 1 capsule by mouth   omeprazole (PriLOSEC) 40 MG capsule   No No   Sig: TAKE 1 CAPSULE (40 MG TOTAL) BY MOUTH DAILY.   sertraline (ZOLOFT) 50 mg tablet   Yes No   Sig: Take 50 mg by mouth daily      Facility-Administered Medications: None       Past Medical History:   Diagnosis Date    Abnormal Pap smear of cervix     Early 20's     Depression     Disease of thyroid gland     GERD (gastroesophageal reflux disease)     Uterine anomaly     history of uterine septum    WPW (Holley-Parkinson-White syndrome)        Past Surgical History:   Procedure Laterality Date    ABLATION RADIO FREQUENCY ATRIAL (MAZE/CRYOABLATION)      ANKLE SURGERY      CERVICAL CONE BIOPSY      CHOLECYSTECTOMY      KNEE  SURGERY      acl    MO CONIZATION CERVIX W/WO D&C RPR KNIFE/LASER      THYROIDECTOMY         Family History   Problem Relation Age of Onset    Stomach cancer Mother     Sarcoidosis Father     Diabetes Sister     No Known Problems Maternal Grandmother     Breast cancer Paternal Grandmother 70    No Known Problems Paternal Aunt      I have reviewed and agree with the history as documented.    E-Cigarette/Vaping    E-Cigarette Use Never User      E-Cigarette/Vaping Substances    Nicotine No     THC No     CBD No     Flavoring No     Other No     Unknown No      Social History     Tobacco Use    Smoking status: Never    Smokeless tobacco: Never   Vaping Use    Vaping status: Never Used   Substance Use Topics    Alcohol use: Yes     Comment: less than weekly    Drug use: No       Review of Systems   Constitutional:  Positive for fatigue.   Eyes: Negative.    Respiratory: Negative.     Cardiovascular: Negative.    Gastrointestinal:  Positive for abdominal pain, diarrhea, nausea and vomiting. Negative for abdominal distention, anal bleeding, blood in stool and rectal pain.   Endocrine: Negative.    Genitourinary: Negative.    Musculoskeletal: Negative.    Skin: Negative.    Psychiatric/Behavioral: Negative.         Physical Exam  Physical Exam    Vital Signs  ED Triage Vitals [08/10/24 1906]   Temperature Pulse Respirations Blood Pressure SpO2   97.9 °F (36.6 °C) 87 18 141/63 97 %      Temp src Heart Rate Source Patient Position - Orthostatic VS BP Location FiO2 (%)   -- -- -- -- --      Pain Score       4           Vitals:    08/10/24 1906   BP: 141/63   Pulse: 87         Visual Acuity      ED Medications  Medications   sodium chloride 0.9 % bolus 1,000 mL (1,000 mL Intravenous New Bag 8/10/24 1922)   ondansetron (ZOFRAN) injection 4 mg (4 mg Intravenous Given 8/10/24 1920)       Diagnostic Studies  Results Reviewed       Procedure Component Value Units Date/Time    CBC and differential [[id number]]  (Abnormal) Collected:  08/10/24 1921    Lab Status: Final result Specimen: Blood from Arm, Left Updated: 08/10/24 1930     WBC 8.51 Thousand/uL      RBC 5.34 Million/uL      Hemoglobin 15.2 g/dL      Hematocrit 45.7 %      MCV 86 fL      MCH 28.5 pg      MCHC 33.3 g/dL      RDW 12.5 %      MPV 10.0 fL      Platelets 341 Thousands/uL      nRBC 0 /100 WBCs      Segmented % 67 %      Immature Grans % 1 %      Lymphocytes % 20 %      Monocytes % 10 %      Eosinophils Relative 2 %      Basophils Relative 0 %      Absolute Neutrophils 5.67 Thousands/µL      Absolute Immature Grans 0.04 Thousand/uL      Absolute Lymphocytes 1.74 Thousands/µL      Absolute Monocytes 0.85 Thousand/µL      Eosinophils Absolute 0.19 Thousand/µL      Basophils Absolute 0.02 Thousands/µL     Comprehensive metabolic panel [[id number]] Collected: 08/10/24 1921    Lab Status: In process Specimen: Blood from Arm, Left Updated: 08/10/24 1927    Lipase [[id number]] Collected: 08/10/24 1921    Lab Status: In process Specimen: Blood from Arm, Left Updated: 08/10/24 1927    hCG, quantitative, pregnancy [[id number]] Collected: 08/10/24 1921    Lab Status: In process Specimen: Blood from Arm, Left Updated: 08/10/24 1927    UA w Reflex to Microscopic w Reflex to Culture [[id number]]     Lab Status: No result Specimen: Urine                    No orders to display              Procedures  Procedures         ED Course                                               Medical Decision Making  Amount and/or Complexity of Data Reviewed  Labs: ordered.    Risk  Prescription drug management.                 Disposition  Final diagnoses:   None     ED Disposition       None          Follow-up Information    None         Patient's Medications   Discharge Prescriptions    No medications on file       No discharge procedures on file.    PDMP Review       None            ED Provider  Electronically Signed by

## 2024-08-12 LAB
ATRIAL RATE: 99 BPM
P AXIS: 8 DEGREES
PR INTERVAL: 126 MS
QRS AXIS: 63 DEGREES
QRSD INTERVAL: 76 MS
QT INTERVAL: 346 MS
QTC INTERVAL: 444 MS
T WAVE AXIS: 42 DEGREES
VENTRICULAR RATE: 99 BPM

## 2024-08-12 PROCEDURE — 93010 ELECTROCARDIOGRAM REPORT: CPT | Performed by: INTERNAL MEDICINE

## 2025-01-06 ENCOUNTER — HOSPITAL ENCOUNTER (OUTPATIENT)
Dept: ULTRASOUND IMAGING | Facility: CLINIC | Age: 45
Discharge: HOME | End: 2025-01-06
Payer: COMMERCIAL

## 2025-01-06 DIAGNOSIS — R59.0 AXILLARY LYMPHADENOPATHY: ICD-10-CM

## 2025-01-06 DIAGNOSIS — N63.11 BREAST LUMP ON RIGHT SIDE AT 11 O'CLOCK POSITION: ICD-10-CM

## 2025-01-06 PROCEDURE — 76642 ULTRASOUND BREAST LIMITED: CPT

## 2025-01-06 NOTE — PROGRESS NOTES
Met with patient and Dr. Nicolás Martinez regarding recommendation for;    ___X__ RIGHT ______LEFT      __X___Ultrasound guided  ______Stereotactic axilla biopsy.      __X___Verbalized understanding.    Reviewed clip placement with patient, pt states understanding: Yes: _X___ No: ____  Comments:    Blood thinners:  No: __X___ Yes: ______ What:          Biopsy teaching sheet given:  Yes: ___X___ No: ________    Pt given contact information and adv to call with any questions/needs    Patient advised to arrive at 1330 for a 1400 appointment

## 2025-01-22 ENCOUNTER — HOSPITAL ENCOUNTER (OUTPATIENT)
Dept: ULTRASOUND IMAGING | Facility: CLINIC | Age: 45
Discharge: HOME | End: 2025-01-22
Payer: COMMERCIAL

## 2025-01-22 VITALS — DIASTOLIC BLOOD PRESSURE: 85 MMHG | SYSTOLIC BLOOD PRESSURE: 135 MMHG | HEART RATE: 82 BPM

## 2025-01-22 DIAGNOSIS — R92.8 ABNORMAL ULTRASOUND OF BREAST: ICD-10-CM

## 2025-01-22 PROCEDURE — 88341 IMHCHEM/IMCYTCHM EA ADD ANTB: CPT | Performed by: STUDENT IN AN ORGANIZED HEALTH CARE EDUCATION/TRAINING PROGRAM

## 2025-01-22 PROCEDURE — 88184 FLOWCYTOMETRY/ TC 1 MARKER: CPT | Performed by: PHYSICIAN ASSISTANT

## 2025-01-22 PROCEDURE — 88360 TUMOR IMMUNOHISTOCHEM/MANUAL: CPT | Performed by: STUDENT IN AN ORGANIZED HEALTH CARE EDUCATION/TRAINING PROGRAM

## 2025-01-22 PROCEDURE — 76942 ECHO GUIDE FOR BIOPSY: CPT

## 2025-01-22 PROCEDURE — 88307 TISSUE EXAM BY PATHOLOGIST: CPT | Performed by: STUDENT IN AN ORGANIZED HEALTH CARE EDUCATION/TRAINING PROGRAM

## 2025-01-22 PROCEDURE — 88364 INSITU HYBRIDIZATION (FISH): CPT | Performed by: STUDENT IN AN ORGANIZED HEALTH CARE EDUCATION/TRAINING PROGRAM

## 2025-01-22 PROCEDURE — 38505 NEEDLE BIOPSY LYMPH NODES: CPT

## 2025-01-22 PROCEDURE — 88365 INSITU HYBRIDIZATION (FISH): CPT | Performed by: STUDENT IN AN ORGANIZED HEALTH CARE EDUCATION/TRAINING PROGRAM

## 2025-01-22 PROCEDURE — 88342 IMHCHEM/IMCYTCHM 1ST ANTB: CPT | Performed by: STUDENT IN AN ORGANIZED HEALTH CARE EDUCATION/TRAINING PROGRAM

## 2025-01-22 PROCEDURE — 88185 FLOWCYTOMETRY/TC ADD-ON: CPT | Performed by: PHYSICIAN ASSISTANT

## 2025-01-22 RX ORDER — LIDOCAINE HYDROCHLORIDE 10 MG/ML
5 INJECTION, SOLUTION EPIDURAL; INFILTRATION; INTRACAUDAL; PERINEURAL ONCE
Status: COMPLETED | OUTPATIENT
Start: 2025-01-22 | End: 2025-01-22

## 2025-01-22 RX ADMIN — LIDOCAINE HYDROCHLORIDE 5 ML: 10 INJECTION, SOLUTION EPIDURAL; INFILTRATION; INTRACAUDAL; PERINEURAL at 14:10

## 2025-01-22 NOTE — PROGRESS NOTES
Procedure type:    __x___ultrasound guided _____stereotactic    Breast:    _____Left __x___Right    Location: axilla    Needle: 12 gauge joey     # of passes: 4 ( 2 for flow cytometry, 2 for surgical pathology )    Clip: Hydromark dragonfly    Performed by: Dr. Nicolás Martinez     Pressure held for 5 minutes by: Suzy Thompson     Sterliv Strips:    __x___yes _____no    Band aid:    __x___yes_____no    Tape and guaze:    _____yes ___x__no    Tolerated procedure:    ___x__yes _____no

## 2025-01-22 NOTE — PROGRESS NOTES
Ice pack given:    __X___yes _____no    Discharge instructions reviewed and given to patient:    __X___yes _____no    Discharged via:    __X___amulatory    _____wheelchair    _____stretcher    Biopsy site clean and dry with no bleeding on discharge:    __X___yes ____no  Patient would like results over the phone.  Ok to leave  message.

## 2025-01-30 LAB — SCAN RESULT: NORMAL

## 2025-02-02 ENCOUNTER — RESULTS FOLLOW-UP (OUTPATIENT)
Dept: ULTRASOUND IMAGING | Facility: CLINIC | Age: 45
End: 2025-02-02

## 2025-02-03 ENCOUNTER — TELEPHONE (OUTPATIENT)
Dept: MAMMOGRAPHY | Facility: CLINIC | Age: 45
End: 2025-02-03

## 2025-02-03 PROCEDURE — 88365 INSITU HYBRIDIZATION (FISH): CPT | Performed by: STUDENT IN AN ORGANIZED HEALTH CARE EDUCATION/TRAINING PROGRAM

## 2025-02-03 PROCEDURE — 88364 INSITU HYBRIDIZATION (FISH): CPT | Performed by: STUDENT IN AN ORGANIZED HEALTH CARE EDUCATION/TRAINING PROGRAM

## 2025-02-03 PROCEDURE — 88360 TUMOR IMMUNOHISTOCHEM/MANUAL: CPT | Performed by: STUDENT IN AN ORGANIZED HEALTH CARE EDUCATION/TRAINING PROGRAM

## 2025-02-03 PROCEDURE — 88341 IMHCHEM/IMCYTCHM EA ADD ANTB: CPT | Performed by: STUDENT IN AN ORGANIZED HEALTH CARE EDUCATION/TRAINING PROGRAM

## 2025-02-03 PROCEDURE — 88307 TISSUE EXAM BY PATHOLOGIST: CPT | Performed by: STUDENT IN AN ORGANIZED HEALTH CARE EDUCATION/TRAINING PROGRAM

## 2025-02-03 PROCEDURE — 88342 IMHCHEM/IMCYTCHM 1ST ANTB: CPT | Performed by: STUDENT IN AN ORGANIZED HEALTH CARE EDUCATION/TRAINING PROGRAM

## 2025-05-22 ENCOUNTER — ANNUAL EXAM (OUTPATIENT)
Dept: OBGYN CLINIC | Facility: CLINIC | Age: 45
End: 2025-05-22

## 2025-05-22 VITALS
BODY MASS INDEX: 35.82 KG/M2 | DIASTOLIC BLOOD PRESSURE: 82 MMHG | WEIGHT: 209.8 LBS | SYSTOLIC BLOOD PRESSURE: 130 MMHG | HEIGHT: 64 IN

## 2025-05-22 DIAGNOSIS — Z01.419 WOMEN'S ANNUAL ROUTINE GYNECOLOGICAL EXAMINATION: Primary | ICD-10-CM

## 2025-05-22 DIAGNOSIS — Z12.11 COLON CANCER SCREENING: ICD-10-CM

## 2025-05-22 DIAGNOSIS — Z12.31 ENCOUNTER FOR SCREENING MAMMOGRAM FOR MALIGNANT NEOPLASM OF BREAST: ICD-10-CM

## 2025-05-22 PROBLEM — M17.9 OSTEOARTHRITIS OF KNEE: Status: ACTIVE | Noted: 2018-11-06

## 2025-05-22 RX ORDER — TIRZEPATIDE 2.5 MG/.5ML
2.5 INJECTION, SOLUTION SUBCUTANEOUS WEEKLY
COMMUNITY
Start: 2025-05-14

## 2025-05-22 RX ORDER — ALBUTEROL SULFATE 90 UG/1
2 INHALANT RESPIRATORY (INHALATION) EVERY 6 HOURS PRN
COMMUNITY
Start: 2025-02-26

## 2025-05-22 NOTE — PROGRESS NOTES
ASSESSMENT & PLAN:   Diagnoses and all orders for this visit:    Women's annual routine gynecological examination    Encounter for screening mammogram for malignant neoplasm of breast  -     Mammo screening bilateral w 3d and cad; Future    Colon cancer screening  -     Ambulatory Referral to Gastroenterology; Future    Other orders  -     albuterol (PROVENTIL HFA,VENTOLIN HFA) 90 mcg/act inhaler; Inhale 2 puffs every 6 (six) hours as needed  -     tirzepatide (Zepbound) 2.5 mg/0.5 mL auto-injector; Inject 2.5 mg under the skin Once a week          The following were reviewed in today's visit: ASCCP guidelines, Gardasil vaccination, STD testing breast self exam, mammography screening ordered, STD testing, exercise, healthy diet, and colonoscopy discussed and ordered.    Patient to return to office in yearly for annual exam.     All questions have been answered to her satisfaction.        CC:  Annual Gynecologic Examination  Chief Complaint   Patient presents with    Gynecologic Exam     Pt here for yearly exam, pap UTD  Periods regular, every month lasting about 4 days  No gyn complaints  No bladder/bowel concerns  Breast lymph node bx right 2025       HPI: Yesenia Lemons is a 44 y.o.  who presents for annual gynecologic examination.  She has the following concerns:  none at this time. Doing well. Active lifestyle, working on improving       Health Maintenance:    Exercise: intermittently  Breast exams/breast awareness: yes  Last mammogram:     Past Medical History[1]    Past Surgical History[2]    Past OB/Gyn History:   Patient's last menstrual period was 2025 (approximate).    Last Pap:  : no abnormalities  History of abnormal Pap smear: Yes - many years ago  HPV vaccine completed: received 2 of 3    Patient is currently sexually active.   STD testing: no  Current contraception: vasectomy      Family History  Family History[3]    Family history of uterine or ovarian cancer: no  Family  history of breast cancer: yes  Family history of colon cancer: no    Social History:  Social History     Socioeconomic History    Marital status: /Civil Union     Spouse name: Not on file    Number of children: Not on file    Years of education: Not on file    Highest education level: Not on file   Occupational History    Not on file   Tobacco Use    Smoking status: Never    Smokeless tobacco: Never   Vaping Use    Vaping status: Never Used   Substance and Sexual Activity    Alcohol use: Yes     Comment: less than weekly    Drug use: No    Sexual activity: Yes     Partners: Male     Birth control/protection: Male Sterilization   Other Topics Concern    Not on file   Social History Narrative    Not on file     Social Drivers of Health     Financial Resource Strain: Low Risk  (3/5/2024)    Received from [facility]    Overall Financial Resource Strain (CARDIA)     Difficulty of Paying Living Expenses: Not very hard   Food Insecurity: No Food Insecurity (3/5/2024)    Received from [facility]    Hunger Vital Sign     Within the past 12 months, you worried that your food would run out before you got the money to buy more.: Never true     Within the past 12 months, the food you bought just didn't last and you didn't have money to get more.: Never true   Transportation Needs: No Transportation Needs (3/5/2024)    Received from [facility]    PRAPARE - Transportation     Lack of Transportation (Medical): No     Lack of Transportation (Non-Medical): No   Physical Activity: Not on file   Stress: Stress Concern Present (3/5/2024)    Received from [facility]    [employer] - Occupational Stress Questionnaire     Feeling of Stress : To some extent   Social Connections: Feeling Socially Integrated (3/5/2024)    Received from [facility]    OASIS : Social Isolation     How often do you feel lonely or  "isolated from those around you?: Never   Intimate Partner Violence: Not At Risk (3/5/2024)    Received from [facility]    Humiliation, Afraid, Rape, and Kick questionnaire     Within the last year, have you been afraid of your partner or ex-partner?: No     Within the last year, have you been humiliated or emotionally abused in other ways by your partner or ex-partner?: No     Within the last year, have you been kicked, hit, slapped, or otherwise physically hurt by your partner or ex-partner?: No     Within the last year, have you been raped or forced to have any kind of sexual activity by your partner or ex-partner?: No   Housing Stability: Low Risk  (3/5/2024)    Received from [facility]    Housing Stability Vital Sign     Unable to Pay for Housing in the Last Year: No     Number of Places Lived in the Last Year: 1     Unstable Housing in the Last Year: No     Domestic violence screen: negative    Allergies:  Allergies[4]    Medications:  Current Medications[5]    Review of Systems:  Review of Systems   Constitutional:  Negative for chills, fever and unexpected weight change.   Respiratory:  Negative for shortness of breath.    Cardiovascular:  Negative for chest pain.   Gastrointestinal:  Negative for abdominal pain, diarrhea, nausea and vomiting.   Skin:  Negative for rash.   Psychiatric/Behavioral:  Negative for dysphoric mood. The patient is not nervous/anxious.          Physical Exam:  /82 (BP Location: Right arm, Patient Position: Sitting, Cuff Size: Standard)   Ht 5' 4\" (1.626 m)   Wt 95.2 kg (209 lb 12.8 oz)   LMP 05/01/2025 (Approximate)   BMI 36.01 kg/m²    Physical Exam  Constitutional:       Appearance: Normal appearance.   Genitourinary:      Vulva and urethral meatus normal.      No lesions in the vagina.      Right Labia: No rash or lesions.     Left Labia: No lesions or rash.     No vaginal discharge, erythema or bleeding.        Right Adnexa: not " tender and no mass present.     Left Adnexa: not tender and no mass present.     No cervical discharge or lesion.      Uterus is not tender.   Breasts:     Breasts are symmetrical.      Right: No mass or skin change.      Left: No mass or skin change.   HENT:      Head: Normocephalic and atraumatic.     Cardiovascular:      Rate and Rhythm: Normal rate and regular rhythm.      Heart sounds: Normal heart sounds. No murmur heard.     No friction rub. No gallop.   Pulmonary:      Effort: Pulmonary effort is normal.      Breath sounds: Normal breath sounds. No wheezing, rhonchi or rales.   Abdominal:      General: Abdomen is flat. There is no distension.      Palpations: Abdomen is soft.      Tenderness: There is no abdominal tenderness.     Musculoskeletal:      Cervical back: Neck supple.   Lymphadenopathy:      Upper Body:      Right upper body: No axillary adenopathy.      Left upper body: No axillary adenopathy.     Neurological:      General: No focal deficit present.      Mental Status: She is alert.     Skin:     General: Skin is warm and dry.     Psychiatric:         Mood and Affect: Mood normal.         Behavior: Behavior normal.   Vitals reviewed.                                    [1]   Past Medical History:  Diagnosis Date    Abnormal Pap smear of cervix     Early 20's     Depression     Disease of thyroid gland     GERD (gastroesophageal reflux disease)     Uterine anomaly     history of uterine septum    WPW (Holley-Parkinson-White syndrome)    [2]   Past Surgical History:  Procedure Laterality Date    ABLATION RADIO FREQUENCY ATRIAL (MAZE/CRYOABLATION)      ANKLE SURGERY      CERVICAL CONE BIOPSY      CHOLECYSTECTOMY      KNEE SURGERY      acl    ME CONIZATION CERVIX W/WO D&C RPR KNIFE/LASER      THYROIDECTOMY      US GUIDED BREAST LYMPH NODE BIOPSY RIGHT Right 1/22/2025   [3]   Family History  Problem Relation Name Age of Onset    Stomach cancer Mother      Sarcoidosis Father      Diabetes Sister      No  Known Problems Maternal Grandmother      Breast cancer Paternal Grandmother  70    No Known Problems Paternal Aunt     [4]   Allergies  Allergen Reactions    Buspirone Other (See Comments)     MAKES HER JITTERY    Cephalosporins Hives    Penicillins Hives    Sulfa Antibiotics Hives   [5]   Current Outpatient Medications:     albuterol (PROVENTIL HFA,VENTOLIN HFA) 90 mcg/act inhaler, Inhale 2 puffs every 6 (six) hours as needed, Disp: , Rfl:     omeprazole (PriLOSEC) 40 MG capsule, TAKE 1 CAPSULE (40 MG TOTAL) BY MOUTH DAILY., Disp: 90 capsule, Rfl: 2    sertraline (ZOLOFT) 50 mg tablet, Take 50 mg by mouth in the morning., Disp: , Rfl:     Synthroid 175 MCG tablet, , Disp: , Rfl:     tirzepatide (Zepbound) 2.5 mg/0.5 mL auto-injector, Inject 2.5 mg under the skin Once a week, Disp: , Rfl:     Vitamin D, Cholecalciferol, 25 MCG (1000 UT) CAPS, Take 1 capsule by mouth, Disp: , Rfl:

## 2025-08-21 ENCOUNTER — HOSPITAL ENCOUNTER (EMERGENCY)
Facility: HOSPITAL | Age: 45
Discharge: HOME | End: 2025-08-21
Attending: EMERGENCY MEDICINE | Admitting: EMERGENCY MEDICINE
Payer: COMMERCIAL

## 2025-08-21 ENCOUNTER — APPOINTMENT (EMERGENCY)
Dept: CT IMAGING | Facility: HOSPITAL | Age: 45
End: 2025-08-21
Payer: COMMERCIAL

## 2025-08-21 VITALS
RESPIRATION RATE: 18 BRPM | TEMPERATURE: 97.7 F | BODY MASS INDEX: 33.29 KG/M2 | WEIGHT: 195 LBS | HEART RATE: 91 BPM | DIASTOLIC BLOOD PRESSURE: 69 MMHG | HEIGHT: 64 IN | OXYGEN SATURATION: 95 % | SYSTOLIC BLOOD PRESSURE: 115 MMHG

## 2025-08-21 DIAGNOSIS — K52.9 GASTROENTERITIS: Primary | ICD-10-CM

## 2025-08-21 LAB
ALBUMIN SERPL BCG-MCNC: 4.8 G/DL (ref 3.5–5)
ALP SERPL-CCNC: 71 U/L (ref 34–104)
ALT SERPL W P-5'-P-CCNC: 24 U/L (ref 7–52)
AMORPH URATE CRY URNS QL MICRO: NORMAL /HPF
ANION GAP SERPL CALCULATED.3IONS-SCNC: 9 MMOL/L (ref 4–13)
AST SERPL W P-5'-P-CCNC: 27 U/L (ref 13–39)
BACTERIA UR QL AUTO: NORMAL /HPF
BASOPHILS # BLD AUTO: 0.01 THOUSANDS/ÂΜL (ref 0–0.1)
BASOPHILS NFR BLD AUTO: 0 % (ref 0–1)
BILIRUB SERPL-MCNC: 0.81 MG/DL (ref 0.2–1)
BILIRUB UR QL STRIP: NEGATIVE
BUN SERPL-MCNC: 12 MG/DL (ref 5–25)
CALCIUM SERPL-MCNC: 10 MG/DL (ref 8.4–10.2)
CHLORIDE SERPL-SCNC: 101 MMOL/L (ref 96–108)
CLARITY UR: CLEAR
CO2 SERPL-SCNC: 26 MMOL/L (ref 21–32)
COLOR UR: YELLOW
CREAT SERPL-MCNC: 0.89 MG/DL (ref 0.6–1.3)
EOSINOPHIL # BLD AUTO: 0.15 THOUSAND/ÂΜL (ref 0–0.61)
EOSINOPHIL NFR BLD AUTO: 2 % (ref 0–6)
ERYTHROCYTE [DISTWIDTH] IN BLOOD BY AUTOMATED COUNT: 12.9 % (ref 11.6–15.1)
GFR SERPL CREATININE-BSD FRML MDRD: 79 ML/MIN/1.73SQ M
GLUCOSE SERPL-MCNC: 133 MG/DL (ref 65–140)
GLUCOSE UR STRIP-MCNC: NEGATIVE MG/DL
HCG SERPL QL: NEGATIVE
HCT VFR BLD AUTO: 50.4 % (ref 34.8–46.1)
HGB BLD-MCNC: 16.9 G/DL (ref 11.5–15.4)
HGB UR QL STRIP.AUTO: ABNORMAL
IMM GRANULOCYTES # BLD AUTO: 0.04 THOUSAND/UL (ref 0–0.2)
IMM GRANULOCYTES NFR BLD AUTO: 0 % (ref 0–2)
KETONES UR STRIP-MCNC: NEGATIVE MG/DL
LEUKOCYTE ESTERASE UR QL STRIP: NEGATIVE
LIPASE SERPL-CCNC: 21 U/L (ref 11–82)
LYMPHOCYTES # BLD AUTO: 1.7 THOUSANDS/ÂΜL (ref 0.6–4.47)
LYMPHOCYTES NFR BLD AUTO: 19 % (ref 14–44)
MAGNESIUM SERPL-MCNC: 2.3 MG/DL (ref 1.9–2.7)
MCH RBC QN AUTO: 28.4 PG (ref 26.8–34.3)
MCHC RBC AUTO-ENTMCNC: 33.5 G/DL (ref 31.4–37.4)
MCV RBC AUTO: 85 FL (ref 82–98)
MONOCYTES # BLD AUTO: 0.98 THOUSAND/ÂΜL (ref 0.17–1.22)
MONOCYTES NFR BLD AUTO: 11 % (ref 4–12)
NEUTROPHILS # BLD AUTO: 6.27 THOUSANDS/ÂΜL (ref 1.85–7.62)
NEUTS SEG NFR BLD AUTO: 68 % (ref 43–75)
NITRITE UR QL STRIP: NEGATIVE
NON-SQ EPI CELLS URNS QL MICRO: NORMAL /HPF
NRBC BLD AUTO-RTO: 0 /100 WBCS
PH UR STRIP.AUTO: 5.5 [PH]
PLATELET # BLD AUTO: 358 THOUSANDS/UL (ref 149–390)
PMV BLD AUTO: 10.3 FL (ref 8.9–12.7)
POTASSIUM SERPL-SCNC: 4.2 MMOL/L (ref 3.5–5.3)
PROT SERPL-MCNC: 8.4 G/DL (ref 6.4–8.4)
PROT UR STRIP-MCNC: ABNORMAL MG/DL
RBC # BLD AUTO: 5.95 MILLION/UL (ref 3.81–5.12)
RBC #/AREA URNS AUTO: NORMAL /HPF
SODIUM SERPL-SCNC: 136 MMOL/L (ref 135–147)
SP GR UR STRIP.AUTO: <=1.005
UROBILINOGEN UR QL STRIP.AUTO: 0.2 E.U./DL
WBC # BLD AUTO: 9.15 THOUSAND/UL (ref 4.31–10.16)
WBC #/AREA URNS AUTO: NORMAL /HPF

## 2025-08-21 PROCEDURE — 96375 TX/PRO/DX INJ NEW DRUG ADDON: CPT

## 2025-08-21 PROCEDURE — 99284 EMERGENCY DEPT VISIT MOD MDM: CPT

## 2025-08-21 PROCEDURE — 83690 ASSAY OF LIPASE: CPT | Performed by: EMERGENCY MEDICINE

## 2025-08-21 PROCEDURE — 96374 THER/PROPH/DIAG INJ IV PUSH: CPT

## 2025-08-21 PROCEDURE — 85025 COMPLETE CBC W/AUTO DIFF WBC: CPT | Performed by: EMERGENCY MEDICINE

## 2025-08-21 PROCEDURE — 36415 COLL VENOUS BLD VENIPUNCTURE: CPT | Performed by: EMERGENCY MEDICINE

## 2025-08-21 PROCEDURE — 80053 COMPREHEN METABOLIC PANEL: CPT | Performed by: EMERGENCY MEDICINE

## 2025-08-21 PROCEDURE — 81001 URINALYSIS AUTO W/SCOPE: CPT | Performed by: EMERGENCY MEDICINE

## 2025-08-21 PROCEDURE — 74177 CT ABD & PELVIS W/CONTRAST: CPT

## 2025-08-21 PROCEDURE — 84703 CHORIONIC GONADOTROPIN ASSAY: CPT | Performed by: EMERGENCY MEDICINE

## 2025-08-21 PROCEDURE — 83735 ASSAY OF MAGNESIUM: CPT | Performed by: EMERGENCY MEDICINE

## 2025-08-21 PROCEDURE — 96361 HYDRATE IV INFUSION ADD-ON: CPT

## 2025-08-21 PROCEDURE — 99285 EMERGENCY DEPT VISIT HI MDM: CPT | Performed by: EMERGENCY MEDICINE

## 2025-08-21 PROCEDURE — 96376 TX/PRO/DX INJ SAME DRUG ADON: CPT

## 2025-08-21 RX ORDER — METOCLOPRAMIDE HYDROCHLORIDE 5 MG/ML
10 INJECTION INTRAMUSCULAR; INTRAVENOUS ONCE
Status: COMPLETED | OUTPATIENT
Start: 2025-08-21 | End: 2025-08-21

## 2025-08-21 RX ORDER — ONDANSETRON 4 MG/1
4 TABLET, FILM COATED ORAL EVERY 6 HOURS
Qty: 20 TABLET | Refills: 0 | Status: SHIPPED | OUTPATIENT
Start: 2025-08-21

## 2025-08-21 RX ORDER — DIPHENHYDRAMINE HYDROCHLORIDE 50 MG/ML
25 INJECTION, SOLUTION INTRAMUSCULAR; INTRAVENOUS ONCE
Status: COMPLETED | OUTPATIENT
Start: 2025-08-21 | End: 2025-08-21

## 2025-08-21 RX ORDER — MORPHINE SULFATE 4 MG/ML
4 INJECTION, SOLUTION INTRAMUSCULAR; INTRAVENOUS ONCE
Status: COMPLETED | OUTPATIENT
Start: 2025-08-21 | End: 2025-08-21

## 2025-08-21 RX ORDER — ONDANSETRON 4 MG/1
4 TABLET, FILM COATED ORAL EVERY 6 HOURS PRN
Qty: 12 TABLET | Refills: 0 | Status: SHIPPED | OUTPATIENT
Start: 2025-08-21 | End: 2025-08-21

## 2025-08-21 RX ORDER — MAGNESIUM HYDROXIDE/ALUMINUM HYDROXICE/SIMETHICONE 120; 1200; 1200 MG/30ML; MG/30ML; MG/30ML
30 SUSPENSION ORAL ONCE
Status: COMPLETED | OUTPATIENT
Start: 2025-08-21 | End: 2025-08-21

## 2025-08-21 RX ORDER — ONDANSETRON 2 MG/ML
4 INJECTION INTRAMUSCULAR; INTRAVENOUS ONCE
Status: COMPLETED | OUTPATIENT
Start: 2025-08-21 | End: 2025-08-21

## 2025-08-21 RX ORDER — DIPHENOXYLATE HYDROCHLORIDE AND ATROPINE SULFATE 2.5; .025 MG/1; MG/1
1 TABLET ORAL ONCE
Status: COMPLETED | OUTPATIENT
Start: 2025-08-21 | End: 2025-08-21

## 2025-08-21 RX ORDER — KETOROLAC TROMETHAMINE 30 MG/ML
15 INJECTION, SOLUTION INTRAMUSCULAR; INTRAVENOUS ONCE
Status: COMPLETED | OUTPATIENT
Start: 2025-08-21 | End: 2025-08-21

## 2025-08-21 RX ORDER — DIPHENOXYLATE HYDROCHLORIDE AND ATROPINE SULFATE 2.5; .025 MG/1; MG/1
1 TABLET ORAL 4 TIMES DAILY PRN
Qty: 30 TABLET | Refills: 0 | Status: SHIPPED | OUTPATIENT
Start: 2025-08-21 | End: 2025-08-31

## 2025-08-21 RX ADMIN — SODIUM CHLORIDE 2000 ML: 0.9 INJECTION, SOLUTION INTRAVENOUS at 05:06

## 2025-08-21 RX ADMIN — DIPHENOXYLATE HYDROCHLORIDE AND ATROPINE SULFATE 1 TABLET: 2.5; .025 TABLET ORAL at 05:02

## 2025-08-21 RX ADMIN — KETOROLAC TROMETHAMINE 15 MG: 30 INJECTION, SOLUTION INTRAMUSCULAR at 05:52

## 2025-08-21 RX ADMIN — ONDANSETRON 4 MG: 2 INJECTION INTRAMUSCULAR; INTRAVENOUS at 05:03

## 2025-08-21 RX ADMIN — MORPHINE SULFATE 4 MG: 4 INJECTION, SOLUTION INTRAMUSCULAR; INTRAVENOUS at 06:13

## 2025-08-21 RX ADMIN — IOHEXOL 100 ML: 350 INJECTION, SOLUTION INTRAVENOUS at 06:30

## 2025-08-21 RX ADMIN — ONDANSETRON 4 MG: 2 INJECTION INTRAMUSCULAR; INTRAVENOUS at 07:03

## 2025-08-21 RX ADMIN — METOCLOPRAMIDE 10 MG: 5 INJECTION, SOLUTION INTRAMUSCULAR; INTRAVENOUS at 08:06

## 2025-08-21 RX ADMIN — DIPHENHYDRAMINE HYDROCHLORIDE 25 MG: 50 INJECTION, SOLUTION INTRAMUSCULAR; INTRAVENOUS at 08:10

## 2025-08-21 RX ADMIN — SODIUM CHLORIDE 1000 ML: 0.9 INJECTION, SOLUTION INTRAVENOUS at 07:25

## 2025-08-21 RX ADMIN — ALUMINUM HYDROXIDE, MAGNESIUM HYDROXIDE, AND SIMETHICONE 30 ML: 200; 200; 20 SUSPENSION ORAL at 05:54
